# Patient Record
Sex: FEMALE | Race: WHITE | URBAN - METROPOLITAN AREA
[De-identification: names, ages, dates, MRNs, and addresses within clinical notes are randomized per-mention and may not be internally consistent; named-entity substitution may affect disease eponyms.]

---

## 2017-01-09 ENCOUNTER — TRANSFERRED RECORDS (OUTPATIENT)
Dept: HEALTH INFORMATION MANAGEMENT | Facility: CLINIC | Age: 47
End: 2017-01-09

## 2017-01-19 ENCOUNTER — MEDICAL CORRESPONDENCE (OUTPATIENT)
Dept: HEALTH INFORMATION MANAGEMENT | Facility: CLINIC | Age: 47
End: 2017-01-19

## 2017-01-19 ENCOUNTER — TRANSFERRED RECORDS (OUTPATIENT)
Dept: HEALTH INFORMATION MANAGEMENT | Facility: CLINIC | Age: 47
End: 2017-01-19

## 2017-01-23 ENCOUNTER — TRANSFERRED RECORDS (OUTPATIENT)
Dept: HEALTH INFORMATION MANAGEMENT | Facility: CLINIC | Age: 47
End: 2017-01-23

## 2017-01-26 ENCOUNTER — OFFICE VISIT (OUTPATIENT)
Dept: SURGERY | Facility: CLINIC | Age: 47
End: 2017-01-26
Attending: STUDENT IN AN ORGANIZED HEALTH CARE EDUCATION/TRAINING PROGRAM
Payer: MEDICARE

## 2017-01-26 VITALS
WEIGHT: 210.4 LBS | DIASTOLIC BLOOD PRESSURE: 77 MMHG | TEMPERATURE: 98.3 F | OXYGEN SATURATION: 98 % | SYSTOLIC BLOOD PRESSURE: 121 MMHG | BODY MASS INDEX: 31.16 KG/M2 | RESPIRATION RATE: 20 BRPM | HEART RATE: 98 BPM | HEIGHT: 69 IN

## 2017-01-26 DIAGNOSIS — Z01.818 PREOPERATIVE TESTING: ICD-10-CM

## 2017-01-26 DIAGNOSIS — K44.9 PARAESOPHAGEAL HERNIA: Primary | ICD-10-CM

## 2017-01-26 PROCEDURE — 99212 OFFICE O/P EST SF 10 MIN: CPT | Mod: ZF | Performed by: STUDENT IN AN ORGANIZED HEALTH CARE EDUCATION/TRAINING PROGRAM

## 2017-01-26 NOTE — Clinical Note
"1/26/2017      RE: Loli Pearson  [address] [place] [address]       THORACIC SURGERY - NEW PATIENT OFFICE VISIT    Dear Dr. Parmar,    I saw Ms. Pearson in consultation for the evaluation and treatment of hiatal hernia.    HPI  Ms. Loli Pearson is a 46 year old female with history of peripheral neuropathy and fibromyalgia who presents with a history of dysphagia for several years. She reports \"swallowing issues\" for years which she describes as feeling like food is stuck in her throat. She underwent EGD with esophageal dilatation November 2015 for this symptom. She is unsure of whether she was told she had esophageal stricture or webs at the time of EGD. She notes slight improvement in dysphagia after procedure which slowly returned over time. She also started to experience acid reflux after dilation procedure that has become quite severe.  She reports that symptoms are daily and severely limit her ability to sleep. She had one episode of emesis last month after drinking soda that came on suddenly.       Previsit Tests  [facility] upper GI: moderate sized hiatal hernia, spontaneous mild reflux  CT 1/23/17: moderately large sliding type hiatal hernia, GE junction above the level of diaphragm, no paraesophageal hernia, small cyst in liver      PMH  Restless leg syndrome  Fibromyalgia  Peripheral neuropathy  Depression/Anxiety     PSH  Right rotator cuff surgery 2014  Back lipoma removal  Hysterectomy age 35  Right ear surgery age 23    SOC  Rare etoh use  Denies tobacco or illicit drug use  Lives with   Has three children  Unemployed    Physical examination  BMI 31       IMPRESSION    Ms. Pearson is a 46 year old female with history of sliding type hiatal hernia complicated by dysphagia and acid reflux who presents for surgical consultation.    PLAN  I spent a total of 30 minutes with Ms. Pearson and her , more than 50% of which were spent in counseling, coordination of care, and face-to-face time. " I reviewed the plan as follows:  1.) Procedure planned: Laparoscopic hiatal hernia repair, possible nissen fundoplication, possible gastropexy, possible gastrostomy tube placement pending work up  Necessary Preop Tests & Appointments:  -PAC assessment  -esophageal manometry  -pH testing    All expressed questions were answered and all parties present were in agreement with the plan.  I appreciate the opportunity to participate in the care of your patient and will keep you updated.  Sincerely,    Cheryl Schilling MD

## 2017-01-26 NOTE — Clinical Note
"1/26/2017       RE: Loli Pearson  [address] [place] [address]     Dear Colleague,    Thank you for referring your patient, Loli Pearson, to the [facility]. Please see a copy of my visit note below.    THORACIC SURGERY - NEW PATIENT OFFICE VISIT    Dear Dr. Parmar,    I saw Ms. Pearson in consultation for the evaluation and treatment of hiatal hernia.    HPI  Ms. Loli Pearson is a 46 year old female with history of peripheral neuropathy and fibromyalgia who presents with a history of dysphagia for several years. She reports \"swallowing issues\" for years which she describes as feeling like food is stuck in her throat. She underwent EGD with esophageal dilatation November 2015 for this symptom. She is unsure of whether she was told she had esophageal stricture or webs at the time of EGD. She notes slight improvement in dysphagia after procedure which slowly returned over time. She also started to experience acid reflux after dilation procedure that has become quite severe.  She reports that symptoms are daily and severely limit her ability to sleep. She had one episode of emesis last month after drinking soda that came on suddenly.       Previsit Tests  [facility] upper GI: moderate sized hiatal hernia, spontaneous mild reflux  CT 1/23/17: moderately large sliding type hiatal hernia, GE junction above the level of diaphragm, no paraesophageal hernia, small cyst in liver      PMH  Restless leg syndrome  Fibromyalgia  Peripheral neuropathy  Depression/Anxiety     PSH  Right rotator cuff surgery 2014  Back lipoma removal  Hysterectomy age 35  Right ear surgery age 23    SOC  Rare etoh use  Denies tobacco or illicit drug use  Lives with   Has three children  Unemployed    Physical examination  BMI 31       IMPRESSION    Ms. Pearson is a 46 year old female with history of sliding type hiatal hernia complicated by dysphagia and acid reflux who presents for surgical consultation.    PLAN  I " spent a total of 30 minutes with Ms. Pearson and her , more than 50% of which were spent in counseling, coordination of care, and face-to-face time. I reviewed the plan as follows:  1.) Procedure planned: Laparoscopic hiatal hernia repair, possible nissen fundoplication, possible gastropexy, possible gastrostomy tube placement pending work up  Necessary Preop Tests & Appointments:  -PAC assessment  -esophageal manometry  -pH testing    All expressed questions were answered and all parties present were in agreement with the plan.  I appreciate the opportunity to participate in the care of your patient and will keep you updated.  Sincerely,      Cheryl Schilling MD

## 2017-01-26 NOTE — PROGRESS NOTES
"THORACIC SURGERY - NEW PATIENT OFFICE VISIT    Dear Dr. Parmar,    I saw Ms. Pearson in consultation for the evaluation and treatment of hiatal hernia.    HPI  Ms. Loli Pearson is a 46 year old female with history of peripheral neuropathy and fibromyalgia who presents with a history of dysphagia for several years. She reports \"swallowing issues\" for years which she describes as feeling like food is stuck in her throat. She underwent EGD with esophageal dilatation November 2015 for this symptom. She is unsure of whether she was told she had esophageal stricture or webs at the time of EGD. She notes slight improvement in dysphagia after procedure which slowly returned over time. She also started to experience acid reflux after dilation procedure that has become quite severe.  She reports that symptoms are daily and severely limit her ability to sleep. She had one episode of emesis last month after drinking soda that came on suddenly.       Previsit Tests  [facility] upper GI: moderate sized hiatal hernia, spontaneous mild reflux  CT 1/23/17: moderately large sliding type hiatal hernia, GE junction above the level of diaphragm, no paraesophageal hernia, small cyst in liver      PMH  Restless leg syndrome  Fibromyalgia  Peripheral neuropathy  Depression/Anxiety     PSH  Right rotator cuff surgery 2014  Back lipoma removal  Hysterectomy age 35  Right ear surgery age 23    SOC  Rare etoh use  Denies tobacco or illicit drug use  Lives with   Has three children  Unemployed    Physical examination  BMI 31       IMPRESSION    Ms. Pearson is a 46 year old female with history of sliding type hiatal hernia complicated by dysphagia and acid reflux who presents for surgical consultation.    PLAN  I spent a total of 30 minutes with Ms. Pearson and her , more than 50% of which were spent in counseling, coordination of care, and face-to-face time. I reviewed the plan as follows:  1.) Procedure planned: Laparoscopic hiatal " hernia repair, possible nissen fundoplication, possible gastropexy, possible gastrostomy tube placement pending work up  Necessary Preop Tests & Appointments:  -PAC assessment  -esophageal manometry  -pH testing    All expressed questions were answered and all parties present were in agreement with the plan.  I appreciate the opportunity to participate in the care of your patient and will keep you updated.  Sincerely,

## 2017-01-26 NOTE — NURSING NOTE
"Loli Pearson is a 46 year old female who presents for:  Chief Complaint   Patient presents with     Oncology Clinic Visit     Pt is here for hiatal hernia        Initial Vitals:  /77 mmHg  Pulse 98  Temp(Src) 98.3  F (36.8  C) (Oral)  Resp 20  Ht 1.753 m (5' 9\")  Wt 95.437 kg (210 lb 6.4 oz)  BMI 31.06 kg/m2  SpO2 98%  Breastfeeding? No Estimated body mass index is 31.06 kg/(m^2) as calculated from the following:    Height as of this encounter: 1.753 m (5' 9\").    Weight as of this encounter: 95.437 kg (210 lb 6.4 oz).. Body surface area is 2.16 meters squared. BP completed using cuff size: large  Data Unavailable No LMP recorded. Patient has had a hysterectomy. Allergies and medications reviewed.     Medications: Medication refills not needed today.  Pharmacy name entered into EPIC: Data Unavailable    Comments:     6 minutes for nursing intake (face to face time)   Shira Leal CMA          "

## 2017-01-31 ENCOUNTER — TELEPHONE (OUTPATIENT)
Dept: SURGERY | Facility: CLINIC | Age: 47
End: 2017-01-31

## 2017-01-31 DIAGNOSIS — K44.9 PARAESOPHAGEAL HERNIA: Primary | ICD-10-CM

## 2017-02-15 ENCOUNTER — HOSPITAL ENCOUNTER (OUTPATIENT)
Facility: CLINIC | Age: 47
Discharge: HOME | End: 2017-02-15
Attending: INTERNAL MEDICINE | Admitting: INTERNAL MEDICINE
Payer: MEDICARE

## 2017-02-15 ENCOUNTER — SURGERY (OUTPATIENT)
Age: 47
End: 2017-02-15

## 2017-02-15 PROCEDURE — 91038 ESOPH IMPED FUNCT TEST > 1HR: CPT | Performed by: INTERNAL MEDICINE

## 2017-02-15 NOTE — OR NURSING
Pt here for manometry/24 hr ph. Procedure explained.  Catheter then placed easily to 55 cm. Catheter calibrated and pulled back to 48 cm for swallows. Swallows given per protocol and pt tolerated  well. Catheter removed. Ph probe then placed  Easily to 60 cm and pt tolerated well. Probe pulled to 33    cm for study.DC and diary instructions given. Pt dc to home in NAD.

## 2017-02-15 NOTE — DISCHARGE INSTRUCTIONS
1.  May resume regular diet.    2.  May have bloody nose, stuffy nose or sore throat after procedure.    3.  If 24 pH probe placed, return the next day to have probe removed.  Removal will only        take 5 minutes.    4.  Any questions call [phone] from 7AM to 4:30PM.  After hours call [phone]      and ask for GI fellow on call.

## 2017-02-15 NOTE — IP AVS SNAPSHOT
MRN:[id number]                      After Visit Summary   2/15/2017    Loli Pearson    MRN: [id number]           Thank you!     Thank you for choosing [facility] for your care. Our goal is always to provide you with excellent care. Hearing back from our patients is one way we can continue to improve our services. Please take a few minutes to complete the written survey that you may receive in the mail after you visit with us. Thank you!        Patient Information     Date Of Birth          1970        About your hospital stay     You were admitted on:  February 15, 2017 You last received care in the:  [facility], Endoscopy    You were discharged on:  February 15, 2017       Who to Call     For medical emergencies, please call 911.  For non-urgent questions about your medical care, please call your primary care provider or clinic, [phone]  For questions related to your surgery, please call your surgery clinic        Attending Provider     Provider Sumanth Garza MD Gastroenterology       Primary Care Provider Office Phone # Fax #    Noemy RizoALEKSANDAR [phone]       [facility] [address] [address]        Your next 10 appointments already scheduled     Mar 13, 2017   Procedure with Cheryl Schilling MD   [facility], Same Day Surgery (--)    [address] [facility] [address] [phone]              Further instructions from your care team       1.  May resume regular diet.    2.  May have bloody nose, stuffy nose or sore throat after procedure.    3.  If 24 pH probe placed, return the next day to have probe removed.  Removal will only        take 5 minutes.    4.  Any questions call [phone] from 7AM to 4:30PM.  After hours call [phone]      and ask for GI fellow on call.      Pending Results     No orders found from 2/13/2017 to 2/16/2017.            Admission Information     Date & Time Provider Department  Dept. Phone    2/15/2017 Sumanth Doyle MD [facility], [place], Endoscopy [phone]      MyChart Information     [employer] gives you secure access to your electronic health record. If you see a primary care provider, you can also send messages to your care team and make appointments. If you have questions, please call your primary care clinic.  If you do not have a primary care provider, please call [phone] and they will assist you.        Care EveryWhere ID     This is your Care EveryWhere ID. This could be used by other organizations to access your [facility] medical records  [id number]           Review of your medicines      UNREVIEWED medicines. Ask your doctor about these medicines        Dose / Directions    BUPROPION HCL PO        Dose:  150 mg   Take 150 mg by mouth daily   Refills:  0       DULOXETINE HCL PO        Dose:  60 mg   Take 60 mg by mouth 2 times daily   Refills:  0       HYDROCHLOROTHIAZIDE PO   Indication:  take half tablet daily        Dose:  25 mg   Take 25 mg by mouth daily   Refills:  0       NORTRIPTYLINE HCL PO        Dose:  25 mg   Take 25 mg by mouth At Bedtime   Refills:  0       * PRAMIPEXOLE DIHYDROCHLORIDE PO        Dose:  0.25 mg   Take 0.25 mg by mouth daily   Refills:  0       * PRAMIPEXOLE DIHYDROCHLORIDE PO        Refills:  0       PROPRANOLOL HCL PO        Dose:  60 mg   Take 60 mg by mouth daily   Refills:  0       TRAZODONE HCL PO        Dose:  50 mg   Take 50 mg by mouth At Bedtime   Refills:  0       * Notice:  This list has 2 medication(s) that are the same as other medications prescribed for you. Read the directions carefully, and ask your doctor or other care provider to review them with you.             Protect others around you: Learn how to safely use, store and throw away your medicines at www.disposemymeds.org.             Medication List: This is a list of all your medications and when to take them. Check marks below indicate your daily home schedule. Keep  this list as a reference.      Medications           Morning Afternoon Evening Bedtime As Needed    BUPROPION HCL PO   Take 150 mg by mouth daily                                DULOXETINE HCL PO   Take 60 mg by mouth 2 times daily                                HYDROCHLOROTHIAZIDE PO   Take 25 mg by mouth daily                                NORTRIPTYLINE HCL PO   Take 25 mg by mouth At Bedtime                                * PRAMIPEXOLE DIHYDROCHLORIDE PO   Take 0.25 mg by mouth daily                                * PRAMIPEXOLE DIHYDROCHLORIDE PO                                PROPRANOLOL HCL PO   Take 60 mg by mouth daily                                TRAZODONE HCL PO   Take 50 mg by mouth At Bedtime                                * Notice:  This list has 2 medication(s) that are the same as other medications prescribed for you. Read the directions carefully, and ask your doctor or other care provider to review them with you.

## 2017-03-10 ENCOUNTER — ANESTHESIA EVENT (OUTPATIENT)
Dept: SURGERY | Facility: CLINIC | Age: 47
DRG: 327 | End: 2017-03-10
Payer: MEDICARE

## 2017-03-11 ASSESSMENT — LIFESTYLE VARIABLES: TOBACCO_USE: 0

## 2017-03-11 NOTE — ANESTHESIA PREPROCEDURE EVALUATION
Anesthesia Evaluation     . Pt has had prior anesthetic. Type: General and MAC    No history of anesthetic complications     ROS/MED HX    ENT/Pulmonary: Comment: CT C/A/P (1/2017): No pulmonary issues noted on CT - neg pulmonary ROS    (-) tobacco use   Neurologic:     (+)neuropathy (FIbromyalgia, peripheral neuropathy with Complex Regional Pain Syndrome of RUE) - post operative CRPS after shoulder surgery, other neuro (Restless Leg Syndrome on Pramipexole)     Cardiovascular:        (-) taking anticoagulants/antiplatelets   METS/Exercise Tolerance:  >4 METS   Hematologic:  - neg hematologic  ROS       Musculoskeletal: Comment: LE Swelling  (+) arthritis (Right Hand), , , other musculoskeletal-       GI/Hepatic:     (+) GERD (Spontaneous Mild Reflux - Dysphagia) Symptomatic, hiatal hernia (Large Sliding Hiatal Hernia above diaphgram), liver disease (Liver cyst),       Renal/Genitourinary:  - ROS Renal section negative       Endo:     (+) Obesity, .      Psychiatric:     (+) psychiatric history depression and anxiety      Infectious Disease:  - neg infectious disease ROS       Malignancy:      - no malignancy   Other:    (+) No chance of pregnancy C-spine cleared: N/A, H/O Chronic Pain,no other significant disability              Physical Exam      Airway   Mallampati: II  TM distance: >3 FB  Neck ROM: full    Dental   (+) chipped    Cardiovascular   Rhythm and rate: regular and normal      Pulmonary    breath sounds clear to auscultation                    Anesthesia Plan      History & Physical Review  History and physical reviewed and following examination; no interval change.    ASA Status:  2 .    NPO Status:  > 8 hours    Plan for General, RSI and ETT with Intravenous and Propofol induction. Maintenance will be Balanced.    PONV prophylaxis:  Ondansetron (or other 5HT-3) and Dexamethasone or Solumedrol  Additional equipment: 2nd IV      Postoperative Care  Postoperative pain management:  IV analgesics, Oral  pain medications and Multi-modal analgesia.      Consents  Anesthetic plan, risks, benefits and alternatives discussed with:  Patient.  Use of blood products discussed: Yes.   Use of blood products discussed with Patient.  Consented to blood products.  .        Procedure: Procedure(s):  Laparoscopic Paraesophageal Herina Repair, Upper Endoscopy, Possible Laparotomy, Possible Laparoscopic Nissen, Possible Gastropexy, Possible Laparoscopic Gastrostomy Tube  Anesthesia general with block  - Wound Class:    - Wound Class:    - Wound Class:    - Wound Class:    - Wound Class:    - Wound Class:     HPI: Loli Pearson is a 46 year old female with history as below who is scheduled for the above procedure.     PMHx/PSHx:  Past Medical History   Diagnosis Date     Gastroesophageal reflux disease      Other chronic pain      Paraesophageal hernia      Shortness of breath      due to hernia     Past Surgical History   Procedure Laterality Date     Hc esoph/gas reflux test w nasal imped >1 hr N/A 2/15/2017     Procedure: ESOPHAGEAL IMPEDENCE FUNCTION TEST WITH 24 HOUR PH GREATER THAN 1 HOUR;  Surgeon: Sumanth Doyle MD;  Location: [facility]     Gyn surgery       partial hysterectomy     Ent surgery       right ear surgery     Biopsy       fatty lipoma on back removed     Orthopedic surgery       right rotator cuff surgery     Colonoscopy       Social History   Substance Use Topics     Smoking status: Never Smoker     Smokeless tobacco: Not on file     Alcohol use No     No Known Allergies  No prescriptions prior to admission.     Current Outpatient Prescriptions   Medication Sig Dispense Refill     HYDROCHLOROTHIAZIDE PO Take 25 mg by mouth daily       PRAMIPEXOLE DIHYDROCHLORIDE PO Take 0.25 mg by mouth daily       NORTRIPTYLINE HCL PO Take 25 mg by mouth At Bedtime       Objective:   Labs  BMP (12/2016): Na (140), K (3.7), BUN (11), Cre (0.8), Glu (110)  CBC (2/2016): Hgb (11.9), Plt (215)    Assessment: Loli Shah  Samples is a 46 year old female with history of symptomatic (GERD, reflux, dysphagia) hiatal hernia who is scheduled for Procedure(s):  Laparoscopic Paraesophageal Herina Repair, Upper Endoscopy, Possible Laparotomy, Possible Laparoscopic Nissen, Possible Gastropexy, Possible Laparoscopic Gastrostomy Tube  Anesthesia general with block  - Wound Class:    - Wound Class:    - Wound Class:    - Wound Class:    - Wound Class:    - Wound Class:  with Dr. Schilling. Patient has notable history of large sliding hiatal hernia (above diaphragm), Fibromyalgia, peripheral neuropathy, CRPS of RUE, arthritis of RUE, Restless leg syndrome, MDD/YAO. No specific cardiac/pulmonary pathology of note.    Plan: To be discussed with staff.   - Possible block post op, pre op tylenol/gabapentin  - GETA/RSI (given spontaneous reflux) with standard ASA monitors, IV induction, balanced anesthetic  - Monitoring and Access: PIV  - Labs: Hgb, BMP, T&S on DOS  - Previous Anesthesia: No reported complications with General anesthesia in past  - Antibiotics per surgery  - PONV prophylaxis    Abundio Bernard, CA-1  [phone]

## 2017-03-13 ENCOUNTER — APPOINTMENT (OUTPATIENT)
Dept: GENERAL RADIOLOGY | Facility: CLINIC | Age: 47
DRG: 327 | End: 2017-03-13
Attending: STUDENT IN AN ORGANIZED HEALTH CARE EDUCATION/TRAINING PROGRAM
Payer: MEDICARE

## 2017-03-13 ENCOUNTER — HOSPITAL ENCOUNTER (INPATIENT)
Facility: CLINIC | Age: 47
LOS: 3 days | Discharge: HOME | DRG: 327 | End: 2017-03-16
Attending: STUDENT IN AN ORGANIZED HEALTH CARE EDUCATION/TRAINING PROGRAM | Admitting: STUDENT IN AN ORGANIZED HEALTH CARE EDUCATION/TRAINING PROGRAM
Payer: MEDICARE

## 2017-03-13 ENCOUNTER — ANESTHESIA (OUTPATIENT)
Dept: SURGERY | Facility: CLINIC | Age: 47
DRG: 327 | End: 2017-03-13
Payer: MEDICARE

## 2017-03-13 DIAGNOSIS — K44.9 HIATAL HERNIA: ICD-10-CM

## 2017-03-13 DIAGNOSIS — Z01.818 PREOPERATIVE TESTING: ICD-10-CM

## 2017-03-13 DIAGNOSIS — G89.18 ACUTE POST-OPERATIVE PAIN: ICD-10-CM

## 2017-03-13 DIAGNOSIS — R19.4 BOWEL HABIT CHANGES: Primary | ICD-10-CM

## 2017-03-13 LAB
ABO + RH BLD: NORMAL
ABO + RH BLD: NORMAL
BLD GP AB SCN SERPL QL: NORMAL
BLOOD BANK CMNT PATIENT-IMP: NORMAL
CREAT SERPL-MCNC: 0.7 MG/DL (ref 0.52–1.04)
GFR SERPL CREATININE-BSD FRML MDRD: 88 ML/MIN/1.7M2
HGB BLD-MCNC: 10.9 G/DL (ref 11.7–15.7)
PLATELET # BLD AUTO: 198 10E9/L (ref 150–450)
POTASSIUM SERPL-SCNC: 3.7 MMOL/L (ref 3.4–5.3)
SPECIMEN EXP DATE BLD: NORMAL

## 2017-03-13 PROCEDURE — 36000062 ZZH SURGERY LEVEL 4 1ST 30 MIN - UMMC: Performed by: STUDENT IN AN ORGANIZED HEALTH CARE EDUCATION/TRAINING PROGRAM

## 2017-03-13 PROCEDURE — 25000132 ZZH RX MED GY IP 250 OP 250 PS 637: Mod: GY | Performed by: THORACIC SURGERY (CARDIOTHORACIC VASCULAR SURGERY)

## 2017-03-13 PROCEDURE — 94640 AIRWAY INHALATION TREATMENT: CPT

## 2017-03-13 PROCEDURE — 36415 COLL VENOUS BLD VENIPUNCTURE: CPT | Performed by: THORACIC SURGERY (CARDIOTHORACIC VASCULAR SURGERY)

## 2017-03-13 PROCEDURE — 25000125 ZZHC RX 250: Performed by: ANESTHESIOLOGY

## 2017-03-13 PROCEDURE — 0DV44ZZ RESTRICTION OF ESOPHAGOGASTRIC JUNCTION, PERCUTANEOUS ENDOSCOPIC APPROACH: ICD-10-PCS | Performed by: STUDENT IN AN ORGANIZED HEALTH CARE EDUCATION/TRAINING PROGRAM

## 2017-03-13 PROCEDURE — 25000132 ZZH RX MED GY IP 250 OP 250 PS 637: Mod: GY | Performed by: SURGERY

## 2017-03-13 PROCEDURE — C9290 INJ, BUPIVACAINE LIPOSOME: HCPCS

## 2017-03-13 PROCEDURE — 0DH60UZ INSERTION OF FEEDING DEVICE INTO STOMACH, OPEN APPROACH: ICD-10-PCS | Performed by: STUDENT IN AN ORGANIZED HEALTH CARE EDUCATION/TRAINING PROGRAM

## 2017-03-13 PROCEDURE — 85018 HEMOGLOBIN: CPT | Performed by: STUDENT IN AN ORGANIZED HEALTH CARE EDUCATION/TRAINING PROGRAM

## 2017-03-13 PROCEDURE — 25800025 ZZH RX 258: Performed by: STUDENT IN AN ORGANIZED HEALTH CARE EDUCATION/TRAINING PROGRAM

## 2017-03-13 PROCEDURE — 25000128 H RX IP 250 OP 636: Performed by: CLINICAL NURSE SPECIALIST

## 2017-03-13 PROCEDURE — 25000125 ZZHC RX 250: Performed by: STUDENT IN AN ORGANIZED HEALTH CARE EDUCATION/TRAINING PROGRAM

## 2017-03-13 PROCEDURE — 25000128 H RX IP 250 OP 636: Performed by: ANESTHESIOLOGY

## 2017-03-13 PROCEDURE — 25000566 ZZH SEVOFLURANE, EA 15 MIN: Performed by: STUDENT IN AN ORGANIZED HEALTH CARE EDUCATION/TRAINING PROGRAM

## 2017-03-13 PROCEDURE — A9270 NON-COVERED ITEM OR SERVICE: HCPCS | Mod: GY | Performed by: THORACIC SURGERY (CARDIOTHORACIC VASCULAR SURGERY)

## 2017-03-13 PROCEDURE — 36415 COLL VENOUS BLD VENIPUNCTURE: CPT | Performed by: STUDENT IN AN ORGANIZED HEALTH CARE EDUCATION/TRAINING PROGRAM

## 2017-03-13 PROCEDURE — 40000275 ZZH STATISTIC RCP TIME EA 10 MIN

## 2017-03-13 PROCEDURE — 12000001 ZZH R&B MED SURG/OB UMMC

## 2017-03-13 PROCEDURE — 40000940 XR CHEST PORT 1 VW

## 2017-03-13 PROCEDURE — 86850 RBC ANTIBODY SCREEN: CPT | Performed by: CLINICAL NURSE SPECIALIST

## 2017-03-13 PROCEDURE — 25000128 H RX IP 250 OP 636

## 2017-03-13 PROCEDURE — 0DX64Z5 TRANSFER STOMACH TO ESOPHAGUS, PERCUTANEOUS ENDOSCOPIC APPROACH: ICD-10-PCS | Performed by: STUDENT IN AN ORGANIZED HEALTH CARE EDUCATION/TRAINING PROGRAM

## 2017-03-13 PROCEDURE — 36000064 ZZH SURGERY LEVEL 4 EA 15 ADDTL MIN - UMMC: Performed by: STUDENT IN AN ORGANIZED HEALTH CARE EDUCATION/TRAINING PROGRAM

## 2017-03-13 PROCEDURE — 0BQS4ZZ REPAIR LEFT DIAPHRAGM, PERCUTANEOUS ENDOSCOPIC APPROACH: ICD-10-PCS | Performed by: STUDENT IN AN ORGANIZED HEALTH CARE EDUCATION/TRAINING PROGRAM

## 2017-03-13 PROCEDURE — 86901 BLOOD TYPING SEROLOGIC RH(D): CPT | Performed by: CLINICAL NURSE SPECIALIST

## 2017-03-13 PROCEDURE — 0BQR4ZZ REPAIR RIGHT DIAPHRAGM, PERCUTANEOUS ENDOSCOPIC APPROACH: ICD-10-PCS | Performed by: STUDENT IN AN ORGANIZED HEALTH CARE EDUCATION/TRAINING PROGRAM

## 2017-03-13 PROCEDURE — 27210794 ZZH OR GENERAL SUPPLY STERILE: Performed by: STUDENT IN AN ORGANIZED HEALTH CARE EDUCATION/TRAINING PROGRAM

## 2017-03-13 PROCEDURE — 25000128 H RX IP 250 OP 636: Performed by: THORACIC SURGERY (CARDIOTHORACIC VASCULAR SURGERY)

## 2017-03-13 PROCEDURE — 25000128 H RX IP 250 OP 636: Performed by: STUDENT IN AN ORGANIZED HEALTH CARE EDUCATION/TRAINING PROGRAM

## 2017-03-13 PROCEDURE — 82565 ASSAY OF CREATININE: CPT | Performed by: THORACIC SURGERY (CARDIOTHORACIC VASCULAR SURGERY)

## 2017-03-13 PROCEDURE — 85049 AUTOMATED PLATELET COUNT: CPT | Performed by: THORACIC SURGERY (CARDIOTHORACIC VASCULAR SURGERY)

## 2017-03-13 PROCEDURE — 84132 ASSAY OF SERUM POTASSIUM: CPT | Performed by: STUDENT IN AN ORGANIZED HEALTH CARE EDUCATION/TRAINING PROGRAM

## 2017-03-13 PROCEDURE — 25000125 ZZHC RX 250: Performed by: THORACIC SURGERY (CARDIOTHORACIC VASCULAR SURGERY)

## 2017-03-13 PROCEDURE — 25000128 H RX IP 250 OP 636: Performed by: SURGERY

## 2017-03-13 PROCEDURE — 40000170 ZZH STATISTIC PRE-PROCEDURE ASSESSMENT II: Performed by: STUDENT IN AN ORGANIZED HEALTH CARE EDUCATION/TRAINING PROGRAM

## 2017-03-13 PROCEDURE — A9270 NON-COVERED ITEM OR SERVICE: HCPCS | Mod: GY | Performed by: SURGERY

## 2017-03-13 PROCEDURE — 86900 BLOOD TYPING SEROLOGIC ABO: CPT | Performed by: CLINICAL NURSE SPECIALIST

## 2017-03-13 PROCEDURE — 37000009 ZZH ANESTHESIA TECHNICAL FEE, EACH ADDTL 15 MIN: Performed by: STUDENT IN AN ORGANIZED HEALTH CARE EDUCATION/TRAINING PROGRAM

## 2017-03-13 PROCEDURE — 71000014 ZZH RECOVERY PHASE 1 LEVEL 2 FIRST HR: Performed by: STUDENT IN AN ORGANIZED HEALTH CARE EDUCATION/TRAINING PROGRAM

## 2017-03-13 PROCEDURE — 37000008 ZZH ANESTHESIA TECHNICAL FEE, 1ST 30 MIN: Performed by: STUDENT IN AN ORGANIZED HEALTH CARE EDUCATION/TRAINING PROGRAM

## 2017-03-13 RX ORDER — CEFAZOLIN SODIUM 2 G/100ML
2 INJECTION, SOLUTION INTRAVENOUS
Status: COMPLETED | OUTPATIENT
Start: 2017-03-13 | End: 2017-03-13

## 2017-03-13 RX ORDER — GABAPENTIN 300 MG/1
300 CAPSULE ORAL ONCE
Status: DISCONTINUED | OUTPATIENT
Start: 2017-03-13 | End: 2017-03-13 | Stop reason: HOSPADM

## 2017-03-13 RX ORDER — IPRATROPIUM BROMIDE AND ALBUTEROL SULFATE 2.5; .5 MG/3ML; MG/3ML
3 SOLUTION RESPIRATORY (INHALATION) 4 TIMES DAILY
Status: DISCONTINUED | OUTPATIENT
Start: 2017-03-13 | End: 2017-03-13

## 2017-03-13 RX ORDER — ALBUTEROL SULFATE 90 UG/1
4 AEROSOL, METERED RESPIRATORY (INHALATION)
Status: DISCONTINUED | OUTPATIENT
Start: 2017-03-13 | End: 2017-03-16 | Stop reason: HOSPADM

## 2017-03-13 RX ORDER — SODIUM CHLORIDE, SODIUM LACTATE, POTASSIUM CHLORIDE, CALCIUM CHLORIDE 600; 310; 30; 20 MG/100ML; MG/100ML; MG/100ML; MG/100ML
INJECTION, SOLUTION INTRAVENOUS CONTINUOUS
Status: DISCONTINUED | OUTPATIENT
Start: 2017-03-13 | End: 2017-03-15

## 2017-03-13 RX ORDER — BUPIVACAINE HYDROCHLORIDE AND EPINEPHRINE 2.5; 5 MG/ML; UG/ML
INJECTION, SOLUTION INFILTRATION; PERINEURAL PRN
Status: DISCONTINUED | OUTPATIENT
Start: 2017-03-13 | End: 2017-03-13

## 2017-03-13 RX ORDER — SODIUM CHLORIDE, SODIUM LACTATE, POTASSIUM CHLORIDE, CALCIUM CHLORIDE 600; 310; 30; 20 MG/100ML; MG/100ML; MG/100ML; MG/100ML
INJECTION, SOLUTION INTRAVENOUS CONTINUOUS
Status: DISCONTINUED | OUTPATIENT
Start: 2017-03-13 | End: 2017-03-13 | Stop reason: HOSPADM

## 2017-03-13 RX ORDER — PANTOPRAZOLE SODIUM 40 MG/1
40 TABLET, DELAYED RELEASE ORAL DAILY
Status: DISCONTINUED | OUTPATIENT
Start: 2017-03-13 | End: 2017-03-16 | Stop reason: HOSPADM

## 2017-03-13 RX ORDER — LABETALOL HYDROCHLORIDE 5 MG/ML
10-40 INJECTION, SOLUTION INTRAVENOUS EVERY 10 MIN PRN
Status: DISCONTINUED | OUTPATIENT
Start: 2017-03-13 | End: 2017-03-16 | Stop reason: HOSPADM

## 2017-03-13 RX ORDER — ALBUTEROL SULFATE 0.83 MG/ML
2.5 SOLUTION RESPIRATORY (INHALATION) 4 TIMES DAILY
Status: DISCONTINUED | OUTPATIENT
Start: 2017-03-13 | End: 2017-03-16 | Stop reason: HOSPADM

## 2017-03-13 RX ORDER — NALOXONE HYDROCHLORIDE 0.4 MG/ML
.1-.4 INJECTION, SOLUTION INTRAMUSCULAR; INTRAVENOUS; SUBCUTANEOUS
Status: DISCONTINUED | OUTPATIENT
Start: 2017-03-13 | End: 2017-03-13 | Stop reason: HOSPADM

## 2017-03-13 RX ORDER — CEFAZOLIN SODIUM 1 G/3ML
1 INJECTION, POWDER, FOR SOLUTION INTRAMUSCULAR; INTRAVENOUS SEE ADMIN INSTRUCTIONS
Status: DISCONTINUED | OUTPATIENT
Start: 2017-03-13 | End: 2017-03-13 | Stop reason: HOSPADM

## 2017-03-13 RX ORDER — BUPIVACAINE HYDROCHLORIDE AND EPINEPHRINE 2.5; 5 MG/ML; UG/ML
INJECTION, SOLUTION EPIDURAL; INFILTRATION; INTRACAUDAL; PERINEURAL PRN
Status: DISCONTINUED | OUTPATIENT
Start: 2017-03-13 | End: 2017-03-13 | Stop reason: HOSPADM

## 2017-03-13 RX ORDER — ACETAMINOPHEN 10 MG/ML
INJECTION, SOLUTION INTRAVENOUS PRN
Status: DISCONTINUED | OUTPATIENT
Start: 2017-03-13 | End: 2017-03-13

## 2017-03-13 RX ORDER — ACETAMINOPHEN 325 MG/1
650 TABLET ORAL EVERY 4 HOURS PRN
Status: DISCONTINUED | OUTPATIENT
Start: 2017-03-16 | End: 2017-03-15

## 2017-03-13 RX ORDER — FENTANYL CITRATE 50 UG/ML
25-50 INJECTION, SOLUTION INTRAMUSCULAR; INTRAVENOUS
Status: DISCONTINUED | OUTPATIENT
Start: 2017-03-13 | End: 2017-03-13 | Stop reason: HOSPADM

## 2017-03-13 RX ORDER — ONDANSETRON 4 MG/1
4 TABLET, ORALLY DISINTEGRATING ORAL EVERY 6 HOURS PRN
Status: DISCONTINUED | OUTPATIENT
Start: 2017-03-13 | End: 2017-03-16 | Stop reason: HOSPADM

## 2017-03-13 RX ORDER — DEXAMETHASONE SODIUM PHOSPHATE 4 MG/ML
INJECTION, SOLUTION INTRA-ARTICULAR; INTRALESIONAL; INTRAMUSCULAR; INTRAVENOUS; SOFT TISSUE PRN
Status: DISCONTINUED | OUTPATIENT
Start: 2017-03-13 | End: 2017-03-13

## 2017-03-13 RX ORDER — ONDANSETRON 2 MG/ML
4 INJECTION INTRAMUSCULAR; INTRAVENOUS EVERY 6 HOURS SCHEDULED
Status: DISCONTINUED | OUTPATIENT
Start: 2017-03-13 | End: 2017-03-16 | Stop reason: HOSPADM

## 2017-03-13 RX ORDER — HYDROMORPHONE HYDROCHLORIDE 1 MG/ML
.3-.5 INJECTION, SOLUTION INTRAMUSCULAR; INTRAVENOUS; SUBCUTANEOUS EVERY 5 MIN PRN
Status: DISCONTINUED | OUTPATIENT
Start: 2017-03-13 | End: 2017-03-13 | Stop reason: HOSPADM

## 2017-03-13 RX ORDER — ONDANSETRON 4 MG/1
4 TABLET, ORALLY DISINTEGRATING ORAL EVERY 30 MIN PRN
Status: DISCONTINUED | OUTPATIENT
Start: 2017-03-13 | End: 2017-03-13 | Stop reason: HOSPADM

## 2017-03-13 RX ORDER — ONDANSETRON 2 MG/ML
4 INJECTION INTRAMUSCULAR; INTRAVENOUS EVERY 30 MIN PRN
Status: DISCONTINUED | OUTPATIENT
Start: 2017-03-13 | End: 2017-03-13 | Stop reason: HOSPADM

## 2017-03-13 RX ORDER — LIDOCAINE HYDROCHLORIDE 20 MG/ML
INJECTION, SOLUTION INFILTRATION; PERINEURAL PRN
Status: DISCONTINUED | OUTPATIENT
Start: 2017-03-13 | End: 2017-03-13

## 2017-03-13 RX ORDER — ACETAMINOPHEN 325 MG/1
975 TABLET ORAL ONCE
Status: DISCONTINUED | OUTPATIENT
Start: 2017-03-13 | End: 2017-03-13 | Stop reason: HOSPADM

## 2017-03-13 RX ORDER — FENTANYL CITRATE 50 UG/ML
INJECTION, SOLUTION INTRAMUSCULAR; INTRAVENOUS PRN
Status: DISCONTINUED | OUTPATIENT
Start: 2017-03-13 | End: 2017-03-13

## 2017-03-13 RX ORDER — LABETALOL HYDROCHLORIDE 5 MG/ML
10 INJECTION, SOLUTION INTRAVENOUS
Status: DISCONTINUED | OUTPATIENT
Start: 2017-03-13 | End: 2017-03-13 | Stop reason: HOSPADM

## 2017-03-13 RX ORDER — ONDANSETRON 2 MG/ML
4 INJECTION INTRAMUSCULAR; INTRAVENOUS EVERY 6 HOURS PRN
Status: DISCONTINUED | OUTPATIENT
Start: 2017-03-13 | End: 2017-03-16 | Stop reason: HOSPADM

## 2017-03-13 RX ORDER — PRAMIPEXOLE DIHYDROCHLORIDE 0.12 MG/1
0.25 TABLET ORAL AT BEDTIME
Status: DISCONTINUED | OUTPATIENT
Start: 2017-03-13 | End: 2017-03-16 | Stop reason: HOSPADM

## 2017-03-13 RX ORDER — NALOXONE HYDROCHLORIDE 0.4 MG/ML
.1-.4 INJECTION, SOLUTION INTRAMUSCULAR; INTRAVENOUS; SUBCUTANEOUS
Status: DISCONTINUED | OUTPATIENT
Start: 2017-03-13 | End: 2017-03-16 | Stop reason: HOSPADM

## 2017-03-13 RX ORDER — NORTRIPTYLINE HCL 25 MG
25 CAPSULE ORAL AT BEDTIME
Status: DISCONTINUED | OUTPATIENT
Start: 2017-03-13 | End: 2017-03-16 | Stop reason: HOSPADM

## 2017-03-13 RX ORDER — PROPOFOL 10 MG/ML
INJECTION, EMULSION INTRAVENOUS PRN
Status: DISCONTINUED | OUTPATIENT
Start: 2017-03-13 | End: 2017-03-13

## 2017-03-13 RX ORDER — ONDANSETRON 2 MG/ML
INJECTION INTRAMUSCULAR; INTRAVENOUS PRN
Status: DISCONTINUED | OUTPATIENT
Start: 2017-03-13 | End: 2017-03-13

## 2017-03-13 RX ORDER — LIDOCAINE 40 MG/G
CREAM TOPICAL
Status: DISCONTINUED | OUTPATIENT
Start: 2017-03-13 | End: 2017-03-13 | Stop reason: HOSPADM

## 2017-03-13 RX ORDER — ACETAMINOPHEN 325 MG/1
975 TABLET ORAL EVERY 8 HOURS
Status: DISCONTINUED | OUTPATIENT
Start: 2017-03-13 | End: 2017-03-15

## 2017-03-13 RX ORDER — OXYCODONE HYDROCHLORIDE 5 MG/1
5-10 TABLET ORAL
Status: DISCONTINUED | OUTPATIENT
Start: 2017-03-13 | End: 2017-03-15

## 2017-03-13 RX ORDER — AMOXICILLIN 250 MG
1-2 CAPSULE ORAL 2 TIMES DAILY
Status: DISCONTINUED | OUTPATIENT
Start: 2017-03-13 | End: 2017-03-14

## 2017-03-13 RX ORDER — SODIUM CHLORIDE, SODIUM LACTATE, POTASSIUM CHLORIDE, CALCIUM CHLORIDE 600; 310; 30; 20 MG/100ML; MG/100ML; MG/100ML; MG/100ML
INJECTION, SOLUTION INTRAVENOUS CONTINUOUS PRN
Status: DISCONTINUED | OUTPATIENT
Start: 2017-03-13 | End: 2017-03-13

## 2017-03-13 RX ORDER — GLYCOPYRROLATE 0.2 MG/ML
INJECTION, SOLUTION INTRAMUSCULAR; INTRAVENOUS PRN
Status: DISCONTINUED | OUTPATIENT
Start: 2017-03-13 | End: 2017-03-13

## 2017-03-13 RX ORDER — NEOSTIGMINE METHYLSULFATE 1 MG/ML
VIAL (ML) INJECTION PRN
Status: DISCONTINUED | OUTPATIENT
Start: 2017-03-13 | End: 2017-03-13

## 2017-03-13 RX ORDER — FLUMAZENIL 0.1 MG/ML
0.2 INJECTION, SOLUTION INTRAVENOUS
Status: DISCONTINUED | OUTPATIENT
Start: 2017-03-13 | End: 2017-03-13 | Stop reason: HOSPADM

## 2017-03-13 RX ADMIN — FENTANYL CITRATE 50 MCG: 50 INJECTION, SOLUTION INTRAMUSCULAR; INTRAVENOUS at 12:10

## 2017-03-13 RX ADMIN — ROCURONIUM BROMIDE 20 MG: 10 INJECTION INTRAVENOUS at 09:28

## 2017-03-13 RX ADMIN — FENTANYL CITRATE 50 MCG: 50 INJECTION, SOLUTION INTRAMUSCULAR; INTRAVENOUS at 12:38

## 2017-03-13 RX ADMIN — PANTOPRAZOLE SODIUM 40 MG: 40 TABLET, DELAYED RELEASE ORAL at 15:46

## 2017-03-13 RX ADMIN — FENTANYL CITRATE 50 MCG: 50 INJECTION, SOLUTION INTRAMUSCULAR; INTRAVENOUS at 08:51

## 2017-03-13 RX ADMIN — PROCHLORPERAZINE EDISYLATE 5 MG: 5 INJECTION INTRAMUSCULAR; INTRAVENOUS at 22:34

## 2017-03-13 RX ADMIN — ACETAMINOPHEN 975 MG: 325 TABLET, FILM COATED ORAL at 15:46

## 2017-03-13 RX ADMIN — IPRATROPIUM BROMIDE AND ALBUTEROL SULFATE 3 ML: .5; 3 SOLUTION RESPIRATORY (INHALATION) at 16:51

## 2017-03-13 RX ADMIN — PHENYLEPHRINE HYDROCHLORIDE 50 MCG: 10 INJECTION, SOLUTION INTRAMUSCULAR; INTRAVENOUS; SUBCUTANEOUS at 09:23

## 2017-03-13 RX ADMIN — CEFAZOLIN 1 G: 1 INJECTION, POWDER, FOR SOLUTION INTRAMUSCULAR; INTRAVENOUS at 10:25

## 2017-03-13 RX ADMIN — PROPOFOL 20 MG: 10 INJECTION, EMULSION INTRAVENOUS at 12:39

## 2017-03-13 RX ADMIN — CEFAZOLIN SODIUM 2 G: 2 INJECTION, SOLUTION INTRAVENOUS at 08:25

## 2017-03-13 RX ADMIN — FENTANYL CITRATE 75 MCG: 50 INJECTION, SOLUTION INTRAMUSCULAR; INTRAVENOUS at 09:30

## 2017-03-13 RX ADMIN — PRAMIPEXOLE DIHYDROCHLORIDE 0.25 MG: 0.12 TABLET ORAL at 22:38

## 2017-03-13 RX ADMIN — MORPHINE SULFATE: 5 INJECTION, SOLUTION INTRAVENOUS at 13:24

## 2017-03-13 RX ADMIN — ROCURONIUM BROMIDE 5 MG: 10 INJECTION INTRAVENOUS at 11:13

## 2017-03-13 RX ADMIN — FENTANYL CITRATE 50 MCG: 50 INJECTION, SOLUTION INTRAMUSCULAR; INTRAVENOUS at 10:28

## 2017-03-13 RX ADMIN — LIDOCAINE HYDROCHLORIDE 100 MG: 20 INJECTION, SOLUTION INFILTRATION; PERINEURAL at 08:07

## 2017-03-13 RX ADMIN — FENTANYL CITRATE 50 MCG: 50 INJECTION, SOLUTION INTRAMUSCULAR; INTRAVENOUS at 13:23

## 2017-03-13 RX ADMIN — GLYCOPYRROLATE 0.8 MG: 0.2 INJECTION, SOLUTION INTRAMUSCULAR; INTRAVENOUS at 12:35

## 2017-03-13 RX ADMIN — BUPIVACAINE HYDROCHLORIDE AND EPINEPHRINE BITARTRATE 40 ML: 2.5; .005 INJECTION, SOLUTION INFILTRATION; PERINEURAL at 07:52

## 2017-03-13 RX ADMIN — MIDAZOLAM 2 MG: 1 INJECTION INTRAMUSCULAR; INTRAVENOUS at 07:57

## 2017-03-13 RX ADMIN — PHENYLEPHRINE HYDROCHLORIDE 50 MCG: 10 INJECTION, SOLUTION INTRAMUSCULAR; INTRAVENOUS; SUBCUTANEOUS at 09:21

## 2017-03-13 RX ADMIN — DEXAMETHASONE SODIUM PHOSPHATE 8 MG: 4 INJECTION, SOLUTION INTRAMUSCULAR; INTRAVENOUS at 08:15

## 2017-03-13 RX ADMIN — SUCCINYLCHOLINE CHLORIDE 100 MG: 20 INJECTION, SOLUTION INTRAMUSCULAR; INTRAVENOUS at 08:08

## 2017-03-13 RX ADMIN — SODIUM CHLORIDE, POTASSIUM CHLORIDE, SODIUM LACTATE AND CALCIUM CHLORIDE: 600; 310; 30; 20 INJECTION, SOLUTION INTRAVENOUS at 07:00

## 2017-03-13 RX ADMIN — FENTANYL CITRATE 75 MCG: 50 INJECTION, SOLUTION INTRAMUSCULAR; INTRAVENOUS at 08:05

## 2017-03-13 RX ADMIN — FENTANYL CITRATE 50 MCG: 50 INJECTION, SOLUTION INTRAMUSCULAR; INTRAVENOUS at 08:40

## 2017-03-13 RX ADMIN — PHENYLEPHRINE HYDROCHLORIDE 100 MCG: 10 INJECTION, SOLUTION INTRAMUSCULAR; INTRAVENOUS; SUBCUTANEOUS at 10:38

## 2017-03-13 RX ADMIN — HYDROMORPHONE HYDROCHLORIDE 0.5 MG: 10 INJECTION, SOLUTION INTRAMUSCULAR; INTRAVENOUS; SUBCUTANEOUS at 13:35

## 2017-03-13 RX ADMIN — ROCURONIUM BROMIDE 5 MG: 10 INJECTION INTRAVENOUS at 12:05

## 2017-03-13 RX ADMIN — ROCURONIUM BROMIDE 5 MG: 10 INJECTION INTRAVENOUS at 11:00

## 2017-03-13 RX ADMIN — SENNOSIDES AND DOCUSATE SODIUM 2 TABLET: 8.6; 5 TABLET ORAL at 21:25

## 2017-03-13 RX ADMIN — FENTANYL CITRATE 50 MCG: 50 INJECTION, SOLUTION INTRAMUSCULAR; INTRAVENOUS at 07:57

## 2017-03-13 RX ADMIN — BUPIVACAINE 20 ML: 13.3 INJECTION, SUSPENSION, LIPOSOMAL INFILTRATION at 07:52

## 2017-03-13 RX ADMIN — ACETAMINOPHEN 1000 MG: 10 INJECTION, SOLUTION INTRAVENOUS at 12:25

## 2017-03-13 RX ADMIN — ROCURONIUM BROMIDE 30 MG: 10 INJECTION INTRAVENOUS at 08:25

## 2017-03-13 RX ADMIN — ONDANSETRON 4 MG: 2 INJECTION INTRAMUSCULAR; INTRAVENOUS at 17:40

## 2017-03-13 RX ADMIN — NORTRIPTYLINE HYDROCHLORIDE 25 MG: 25 CAPSULE ORAL at 22:38

## 2017-03-13 RX ADMIN — PROPOFOL 160 MG: 10 INJECTION, EMULSION INTRAVENOUS at 08:07

## 2017-03-13 RX ADMIN — HYDROMORPHONE HYDROCHLORIDE 0.5 MG: 10 INJECTION, SOLUTION INTRAMUSCULAR; INTRAVENOUS; SUBCUTANEOUS at 13:13

## 2017-03-13 RX ADMIN — ROCURONIUM BROMIDE 10 MG: 10 INJECTION INTRAVENOUS at 10:19

## 2017-03-13 RX ADMIN — Medication 5 MG: at 12:35

## 2017-03-13 RX ADMIN — ONDANSETRON 6 MG: 2 INJECTION INTRAMUSCULAR; INTRAVENOUS at 12:32

## 2017-03-13 RX ADMIN — ROCURONIUM BROMIDE 5 MG: 10 INJECTION INTRAVENOUS at 11:51

## 2017-03-13 RX ADMIN — FENTANYL CITRATE 50 MCG: 50 INJECTION, SOLUTION INTRAMUSCULAR; INTRAVENOUS at 13:05

## 2017-03-13 NOTE — IP AVS SNAPSHOT
[address] [address]    Phone:  [phone]                                       After Visit Summary   3/13/2017    Loli Pearson    MRN: [id number]           After Visit Summary Signature Page     I have received my discharge instructions, and my questions have been answered. I have discussed any challenges I see with this plan with the nurse or doctor.    ..........................................................................................................................................  Patient/Patient Representative Signature      ..........................................................................................................................................  Patient Representative Print Name and Relationship to Patient    ..................................................               ................................................  Date                                            Time    ..........................................................................................................................................  Reviewed by Signature/Title    ...................................................              ..............................................  Date                                                            Time

## 2017-03-13 NOTE — ANESTHESIA PROCEDURE NOTES
Peripheral Nerve Block Procedure Note    Staff:     Anesthesiologist:  ISA SRIVASTAVA    Resident/CRNA:  EVITA GARCIA    Block performed by resident/CRNA in the presence of a teaching physician    Location: Pre-op  Procedure Start/Stop TImes:      3/13/2017 7:45 AM     3/13/2017 7:55 AM    patient identified, IV checked, site marked, risks and benefits discussed, informed consent, monitors and equipment checked, pre-op evaluation, at physician/surgeon's request and post-op pain management      Correct Patient: Yes      Correct Position: Yes      Correct Site: Yes      Correct Procedure: Yes      Correct Laterality:  Yes    Site Marked:  Yes  Procedure details:     Procedure:  TAP    ASA:  2    Diagnosis:  Paraesophageal hernia    Laterality:  Bilateral    Position:  Supine    Sterile Prep: chloraprep      Local skin infiltration:  1% lidocaine    amount (mL):  5    Insertion Site:  T5-6 and T10-11    Needle:  Insulated    Needle gauge:  21    Needle length (inches):  4    Ultrasound: Yes      Ultrasound used to identify targeted nerve, plexus, or vascular structure and placed a needle adjacent to it      Permanent Image entered into patiient's record      Abnormal pain on injection: No      Blood Aspirated: No      Paresthesias:  No    Bleeding at site: No      Test dose negative for signs of intravascular injection: Yes      Bolus via:  Needle    Infusion Method:  Single Shot    Blood aspirated via catheter: No      Complications:  None  Assessment/Narrative:     Injection made incrementally with aspirations every (mL):  5

## 2017-03-13 NOTE — BRIEF OP NOTE
[facility], [place]    Brief Operative Note    Pre-operative diagnosis: Paraesophageal Hernia   Post-operative diagnosis Paraesophageal Hernia   Procedure: Procedure(s):  Laparoscopic Hiatal Hernia Repair with Nissen Fundoplication, Upper Endoscopy, Percutaneous Insertion of Gastrostomy tube - Wound Class: I-Clean   - Wound Class: II-Clean Contaminated  Surgeon: Surgeon(s) and Role:     * Cheryl Schilling MD - Primary     * Matthew Sierra MD - Assisting     * Abdulkadir Griffin MD  Anesthesia: Combined General with Block   Estimated blood loss: 50mL  Drains: PEG tube  Specimens: none  Findings:   Hiatal hernia  Complications: none  Implants: none

## 2017-03-13 NOTE — ANESTHESIA POSTPROCEDURE EVALUATION
Patient: Loli Pearson    Procedure(s):  Laparoscopic Hiatal Hernia Repair with Nissen Fundoplication, Upper Endoscopy, Percutaneous Insertion of Gastrostomy tube - Wound Class: I-Clean   - Wound Class: II-Clean Contaminated    Diagnosis:Paraesophageal Hernia   Diagnosis Additional Information: No value filed.    Anesthesia Type:  General, RSI, ETT    Note:  Anesthesia Post Evaluation    Patient location during evaluation: PACU  Patient participation: Able to fully participate in evaluation  Level of consciousness: awake and alert  Pain management: adequate  Cardiovascular status: acceptable and hypertensive  Respiratory status: acceptable  Hydration status: acceptable  PONV: none       Comments: Patient initially with chest burning consistent with surgery (no abdominal pain with TAP block).  Pain described as sharp and burning.  Now much better controlled with fentanyl 100 and dilaudid 0.5 mg.  Rn is setting up PCA ordered by surgeons.  /90 at present but improving with pain control.        Last vitals:  Vitals:    03/13/17 1100 03/13/17 1253 03/13/17 1300   BP: 105/85 (!) 136/92 (!) 130/91   Resp:      Temp:  36.9  C (98.5  F)    SpO2:            Electronically Signed By: Eusebio Sutherland MD  March 13, 2017  1:28 PM

## 2017-03-13 NOTE — OP NOTE
DATE OF PROCEDURE:  03/13/2017.      SURGEON:  Cheryl Schilling MD      COSURGEON:  Abdulkadir Griffin MD      ASSISTING SURGEON:  Matthew iSerra MD      PREOPERATIVE DIAGNOSIS:  Gastroesophageal reflux disease with moderate to large hiatal hernia.      POSTOPERATIVE DIAGNOSIS:  Gastroesophageal reflux disease with moderate to large hiatal hernia.      ESTIMATED BLOOD LOSS:  50 mL.       OPERATIVE FINDINGS:  Moderate-sized hiatal hernia with short esophagus.      PROCEDURES PERFORMED:  Laparoscopic hiatal hernia repair with Uriah gastroplasty and Nissen fundoplication and laparoscopic-assisted percutaneous endoscopic gastrostomy tube placement.      DESCRIPTION OF PROCEDURE:  After obtaining informed consent, Loli Pearson was brought to the operating room and laid supine on the operating table.  After induction of general anesthesia and introduction of an endotracheal tube, the patient was positioned with the arms out and with the foot board to be able to put her in reverse Trendelenburg position.  The chest and abdomen were prepped and draped in a sterile manner.  Using the Kathie technique, the abdomen was entered in the supraumbilical area just to the left of the umbilicus.  We used a standard 5-port approach and placed fascial stitches for the first port for eventual closure.  The other ports were then placed with laparoscopic guidance and a Gabino retractor was inserted for the liver.  Moderate-sized hiatal hernia was identified.  We then took down the gastrohepatic ligament followed by the short gastrics and the hiatus was dissected circumferentially.  The hernia sac was reduced and mobilized to be able to mobilize the esophagus circumferentially all the way up to the inferior pulmonary vein.  Both vagus nerves were identified and preserved.  After adequate mobilization, we proceeded to close the hiatus with 7-0 silk stitches on pledgets with a 52 bougie in place.  After this was done, we identified the Z-line  endoscopically after mobilization of the esophagogastric fat pad.  There was not enough intraabdominal esophagus.  At this point, we proceeded with the Uriah gastroplasty using purple loads on the Endo SCOUT stapler.  The stomach at the GE junction was wedged out to tubularize additional length of the stomach and gain extra length on the intraabdominal esophagus.  After this had been done, we changed the bougie to a 48.  After this had been done, we changed the bougie again to 52 and proceeded to perform the Nissen wrap by placing the wrap between the esophagus and the anterior vagus using 3 stitches from stomach to the esophagus.  We then removed the bougie and identified with both endoscopy and using graspers that it was not too tight.  We then proceeded to identify a spot in the anterior gastric wall with the help with endoscopy and laparoscopy and passed a needle through the stomach, passed a wire through and pulled it out with the scope through the mouth.  We then looped the PEG tube through it and pulled the PEG tube out through an incision in the left upper quadrant.  We identified the PEG tube in the stomach with the endoscopy again and verified that there was no bleeding internally or externally.  Following this, hemostasis was ensured and the incisions were all closed in layers.  The fascial stitches were closed on the initial port site followed by subdermal and subcuticular layers.  The patient was then recovered and transferred to the recovery room in stable condition.         LITA PASTRANA MD             D: 2017 13:25   T: 2017 14:13   MT: DIONNE      Name:     GRABIEL CANTOR   MRN:      [id number]        Account:        [id number]   :      1970           Procedure Date: 2017      Document: [id number]

## 2017-03-13 NOTE — ANESTHESIA CARE TRANSFER NOTE
Patient: Loli Pearson    Procedure(s):  Laparoscopic Hiatal Hernia Repair with Nissen Fundoplication, Upper Endoscopy, Percutaneous Insertion of Gastrostomy tube - Wound Class: I-Clean   - Wound Class: II-Clean Contaminated    Diagnosis: Paraesophageal Hernia   Diagnosis Additional Information: No value filed.    Anesthesia Type:   General, RSI, ETT     Note:  Airway :Face Mask  Patient transferred to:PACU  Comments: Prior to extubation, patient breathing spontaneously and respiratory rate and tidal volume were appropriate. The patient was following commands. The patient was warm and demonstrated adequate strength. Patient was suctioned and extubated without complication.   Transported to PACU   VSS upon arrival   Care transferred to PACU RN      Vitals: (Last set prior to Anesthesia Care Transfer)    CRNA VITALS  3/13/2017 1218 - 3/13/2017 1252      3/13/2017             Pulse: 117    SpO2: 100 %    Resp Rate (observed): (!)  3    Resp Rate (set): 10                Electronically Signed By: Abundio Bernard MD  March 13, 2017  12:52 PM

## 2017-03-13 NOTE — IP AVS SNAPSHOT
MRN:[id number]                      After Visit Summary   3/13/2017    Loli Pearson    MRN: [id number]           Thank you!     Thank you for choosing [facility] for your care. Our goal is always to provide you with excellent care. Hearing back from our patients is one way we can continue to improve our services. Please take a few minutes to complete the written survey that you may receive in the mail after you visit with us. Thank you!        Patient Information     Date Of Birth          1970        About your hospital stay     You were admitted on:  March 13, 2017 You last received care in the:  [address] [place] [address]    You were discharged on:  March 16, 2017       Who to Call     For medical emergencies, please call 911.  For non-urgent questions about your medical care, please call your primary care provider or clinic, [phone]  For questions related to your surgery, please call your surgery clinic        Attending Provider     Provider Cheryl Veloz MD Thoracic Diseases       Primary Care Provider Office Phone # Fax #    Noemy FredymiALEKSANDAR dai [phone]       [facility] [address] [facility] [address]        After Care Instructions     Discharge Instructions       THORACIC SURGERY DISCHARGE INSTRUCTIONS    DIET: Full liquid diet at time of discharge.  Follow post Nissen diet recommendations as discussed with dietician and detailed in provided handout    Take the medications in solution form provided by the pharmacy, after these solutions run out, you may return to taking them in tablet or capsule form from your home supply.     Given your recent procedure to correct or minimize your reflux, you should no longer need to take your anti-acid medication regularly    If your plans upon discharge include prolonged periods of sitting (i.e a lengthy car or plane ride), it is highly beneficial to get up and walk at least once per hour to help  prevent swelling and blood clots.     You may remove chest tube dressing 48 hours after tube removal and bandage the site at your own discretion thereafter.  Small amounts of leakage are normal for 2-3 days after removal.  Feel free to call with questions.    You may get incision wet 2 days after operation. Do not submerge, soak, or scrub incision or swim until seen in follow-up.    Take incentive spirometer home for continued frequent use    Activity as tolerated, no strenous activity until seen in follow-up, no lifting greater than 10 pounds for the next 8-10 weeks.    Stay hydrated. Take over the counter fiber (metamucil or benefiber) and stool softeners (Miralax, docusate or senna) if becoming constipated.     Call for fever greater than 101.5, chills, increased size of incision, red skin around incision, vision changes, muscle strength changes, sensation changes, shortness of breath, or other concerns.    No driving while taking narcotic pain medication.    Transition to ibuprofen or tylenol/acetaminophen for pain control. Do not take tylenol/acetaminophen and acetaminophen containing narcotic (e.g., percocet or vicodin) at the same time. If you have known ulcer problems, or kidney trouble (elevated creatinine) do not take the ibuprofen.    In emergencies, call 911    For other Questions or Concerns;   A.) During weekday working hours (Monday through Friday 8am to 4:30pm)   call [phone] and ask to speak to a clinical nurse specialist.     B.) At nights (after 4:30pm), on weekends, or if urgent call [phone] and   tell the   I would like to page job code [phone], the thoracic surgery   fellow on call, please.   _____________________________________________    PEG TUBE INSTRUCTIONS:    Venting:  -You should vent or temporarily put your tube to gravity bag (or basin) drainage if you experience nausea or bloating.  This is important as it will help to protect the hernia repair.   If you are  uncertain how to do this, please ask your nurse for instruction.    Skin care:  -Change the gauze dressing around your PEG tube daily.  -Check for redness and swelling in the area where the tube goes into your skin.   -Keep the skin around your tube dry and with a split gauze under the flange. If the hole where the tube enters your body is draining fluid, you may need to put barrier cream or antibiotic cream on the skin around your tube after you are done cleaning it. Cover the area with bandages, and call your caregiver.   -If there are no stitches holding your PEG tube in place on your skin, gently turn the tube. This may decrease pressure on your skin, and help prevent an infection. Ask your caregiver if you should turn your PEG tube, and how to do it correctly.     Flushes:  -Flush your feeding tube with 30cc of water twice daily to ensure it does not become plugged  -If administering medications or tube feedings via your tube, make sure to flush with water immediately after use to prevent the tube from plugging                  Follow-up Appointments     Follow Up and recommended labs and tests       1.) Follow up with primary care physician, Noemy Rizo, in 1-2 weeks.  2.) Follow up with a thoracic surgery Clinical Nurse Specialist in Thoracic Surgery clinic in 1 month, prior to which an upper GI should be performed.                  Future tests that were ordered for you     XR Upper GI without KUB                 Pending Results     No orders found from 3/11/2017 to 3/14/2017.            Statement of Approval     Ordered          03/16/17 1447  I have reviewed and agree with all the recommendations and orders detailed in this document.  EFFECTIVE NOW     Approved and electronically signed by:  Steven Jama PA-C             Admission Information     Date & Time Provider Department Dept. Phone    3/13/2017 Cheryl Schilling MD [address] [facility] [place] [phone]      Your Vitals Were     Blood  "Pressure Pulse Temperature Respirations Height Weight    120/73 (BP Location: Left arm) 91 98.1  F (36.7  C) (Oral) 16 1.753 m (5' 9\") 93.6 kg (206 lb 5.6 oz)    Pulse Oximetry BMI (Body Mass Index)                95% 30.47 kg/m2          [employer] Information     [employer] gives you secure access to your electronic health record. If you see a primary care provider, you can also send messages to your care team and make appointments. If you have questions, please call your primary care clinic.  If you do not have a primary care provider, please call [phone] and they will assist you.        Care EveryWhere ID     This is your Care EveryWhere ID. This could be used by other organizations to access your [facility] medical records  [id number]           Review of your medicines      START taking        Dose / Directions    acetaminophen 160 MG/5ML solution   Commonly known as:  TYLENOL   Used for:  Acute post-operative pain        Dose:  975 mg   Take 30.45 mLs (975 mg) by mouth every 8 hours   Quantity:  500 mL   Refills:  0       docusate 50 MG/5ML liquid   Commonly known as:  COLACE   Used for:  Bowel habit changes        Dose:  100 mg   Take 10 mLs (100 mg) by mouth 2 times daily   Quantity:  300 mL   Refills:  0       oxyCODONE 5 MG/5ML solution   Commonly known as:  ROXICODONE   Used for:  Acute post-operative pain        Dose:  5-10 mg   Take 5-10 mLs (5-10 mg) by mouth every 4 hours as needed for moderate to severe pain   Quantity:  350 mL   Refills:  0       sennosides 8.8 MG/5ML syrup   Commonly known as:  SENOKOT   Used for:  Bowel habit changes        Dose:  10 mL   Take 10 mLs by mouth 2 times daily   Quantity:  236 mL   Refills:  0         CONTINUE these medicines which have NOT CHANGED        Dose / Directions    HYDROCHLOROTHIAZIDE PO   Indication:  take half tablet daily        Dose:  25 mg   Take 25 mg by mouth daily   Refills:  0       NORTRIPTYLINE HCL PO        Dose:  25 mg   Take 25 mg by mouth At " Bedtime   Refills:  0       PRAMIPEXOLE DIHYDROCHLORIDE PO        Dose:  0.25 mg   Take 0.25 mg by mouth daily   Refills:  0            Where to get your medicines      These medications were sent to [facility] - [place], [place] - [address]  [address] [address]     Phone:  [phone]     acetaminophen 160 MG/5ML solution    docusate 50 MG/5ML liquid    sennosides 8.8 MG/5ML syrup         Some of these will need a paper prescription and others can be bought over the counter. Ask your nurse if you have questions.     Bring a paper prescription for each of these medications     oxyCODONE 5 MG/5ML solution                Protect others around you: Learn how to safely use, store and throw away your medicines at www.disposemymeds.org.             Medication List: This is a list of all your medications and when to take them. Check marks below indicate your daily home schedule. Keep this list as a reference.      Medications           Morning Afternoon Evening Bedtime As Needed    acetaminophen 160 MG/5ML solution   Commonly known as:  TYLENOL   Take 30.45 mLs (975 mg) by mouth every 8 hours   Last time this was given:  975 mg on 3/16/2017 10:31 AM                                docusate 50 MG/5ML liquid   Commonly known as:  COLACE   Take 10 mLs (100 mg) by mouth 2 times daily   Last time this was given:  100 mg on 3/15/2017  8:37 PM                                HYDROCHLOROTHIAZIDE PO   Take 25 mg by mouth daily                                NORTRIPTYLINE HCL PO   Take 25 mg by mouth At Bedtime   Last time this was given:  25 mg on 3/15/2017  9:55 PM                                oxyCODONE 5 MG/5ML solution   Commonly known as:  ROXICODONE   Take 5-10 mLs (5-10 mg) by mouth every 4 hours as needed for moderate to severe pain   Last time this was given:  10 mg on 3/16/2017  2:44 PM                                PRAMIPEXOLE DIHYDROCHLORIDE PO   Take 0.25 mg by mouth  daily   Last time this was given:  0.25 mg on 3/15/2017  9:55 PM                                sennosides 8.8 MG/5ML syrup   Commonly known as:  SENOKOT   Take 10 mLs by mouth 2 times daily   Last time this was given:  5 mLs on 3/16/2017  7:43 AM

## 2017-03-13 NOTE — PLAN OF CARE
Problem: Goal Outcome Summary  Goal: Goal Outcome Summary  Outcome: No Change  POST OP STATUS     Received report from PACU alert and oriented. Per pt, pain is well managed with the current PCA morphine of 1.0 mg every 10 minutes. Lap sites x 5 with primapore, CDI. Gtube to gravity, no drainage. MIVF at 50 cc/hr.     PLAN: VS, pain, dangle at bedside, void

## 2017-03-13 NOTE — PROGRESS NOTES
"SURGERY POST-OP CHECK NOTE  3/13/2017 5:24 PM     Patient: Loli Pearson  MRN: [id number]    Subjective  No acute events postoperatively. Pain well controlled. Denies nausea, vomiting, chest pain, shortness of breath. Overall patient feeling well. Hasn't voided since OR, has some urge to and plans to get up soon to try. Has tried some water without issues. No other complaints.      Objective  /75 (BP Location: Left arm)  Pulse 80  Temp 98.8  F (37.1  C) (Oral)  Resp 18  Ht 1.753 m (5' 9\")  Wt 93.6 kg (206 lb 5.6 oz)  SpO2 96%  BMI 30.47 kg/m2  General: AAOx4, NAD, lying in bed, appears comfortable  CV: regular rate and rhythm, warm, well perfused  Pulm: breathing comfortably on NC  Abd: soft, appropriately tender to palpation, non-distended, incisions c/d/i with overlying dressings; PEG in place to gravity drainage  Extremities: without edema  Neuro: facial movement grossly symmetric, moving all extremities spontaneously without apparent deficit    UOP postop:  None since OR    Labs:  None      Assessment/Plan  Loli Pearson is a 46 year old female POD#0 s/p laparoscopic hiatal hernia repair with Nissen fundoplication and insertion of PEG tube, doing well.     Neuro: home nortriptyline and pramipexole     - Pain: morphine PCA, oxycodone prn, Tylenol scheduled  CV: HDS, no issues  Pulm: satting on NC, encourage IS, DuoNebs scheduled  FEN/GI:      - IVF: LR @ 50     - Diet: sips of clears     - G tube to gravity     - Bowel regimen: senna-colace     - Nausea control: Zofran scheduled with additional Zofran prn  Renal: no void yet, continue to monitor  ID: afebrile, no abx  PPx: OOB, IS, SCDs, Lovenox to start in am, Protonix  Dispo: 7B      - - - - - - - - - - - - - - - - - -  Saloni Saldivar MD  General Surgery PGY-1            "

## 2017-03-14 ENCOUNTER — APPOINTMENT (OUTPATIENT)
Dept: GENERAL RADIOLOGY | Facility: CLINIC | Age: 47
DRG: 327 | End: 2017-03-14
Attending: STUDENT IN AN ORGANIZED HEALTH CARE EDUCATION/TRAINING PROGRAM
Payer: MEDICARE

## 2017-03-14 LAB
ANION GAP SERPL CALCULATED.3IONS-SCNC: 9 MMOL/L (ref 3–14)
BUN SERPL-MCNC: 8 MG/DL (ref 7–30)
CALCIUM SERPL-MCNC: 8.3 MG/DL (ref 8.5–10.1)
CHLORIDE SERPL-SCNC: 109 MMOL/L (ref 94–109)
CO2 SERPL-SCNC: 25 MMOL/L (ref 20–32)
CREAT SERPL-MCNC: 0.72 MG/DL (ref 0.52–1.04)
ERYTHROCYTE [DISTWIDTH] IN BLOOD BY AUTOMATED COUNT: 16.7 % (ref 10–15)
GFR SERPL CREATININE-BSD FRML MDRD: 87 ML/MIN/1.7M2
GLUCOSE SERPL-MCNC: 111 MG/DL (ref 70–99)
HCT VFR BLD AUTO: 33.6 % (ref 35–47)
HGB BLD-MCNC: 10.7 G/DL (ref 11.7–15.7)
MAGNESIUM SERPL-MCNC: 2.4 MG/DL (ref 1.6–2.3)
MCH RBC QN AUTO: 28.5 PG (ref 26.5–33)
MCHC RBC AUTO-ENTMCNC: 31.8 G/DL (ref 31.5–36.5)
MCV RBC AUTO: 90 FL (ref 78–100)
PHOSPHATE SERPL-MCNC: 2.1 MG/DL (ref 2.5–4.5)
PLATELET # BLD AUTO: 226 10E9/L (ref 150–450)
POTASSIUM SERPL-SCNC: 4.3 MMOL/L (ref 3.4–5.3)
RBC # BLD AUTO: 3.75 10E12/L (ref 3.8–5.2)
SODIUM SERPL-SCNC: 142 MMOL/L (ref 133–144)
WBC # BLD AUTO: 10.2 10E9/L (ref 4–11)

## 2017-03-14 PROCEDURE — A9270 NON-COVERED ITEM OR SERVICE: HCPCS | Mod: GY | Performed by: PHYSICIAN ASSISTANT

## 2017-03-14 PROCEDURE — 25000132 ZZH RX MED GY IP 250 OP 250 PS 637: Mod: GY | Performed by: PHYSICIAN ASSISTANT

## 2017-03-14 PROCEDURE — 25000128 H RX IP 250 OP 636: Performed by: SURGERY

## 2017-03-14 PROCEDURE — 94640 AIRWAY INHALATION TREATMENT: CPT | Mod: 76

## 2017-03-14 PROCEDURE — A9270 NON-COVERED ITEM OR SERVICE: HCPCS | Mod: GY | Performed by: THORACIC SURGERY (CARDIOTHORACIC VASCULAR SURGERY)

## 2017-03-14 PROCEDURE — 40000275 ZZH STATISTIC RCP TIME EA 10 MIN

## 2017-03-14 PROCEDURE — 83735 ASSAY OF MAGNESIUM: CPT | Performed by: THORACIC SURGERY (CARDIOTHORACIC VASCULAR SURGERY)

## 2017-03-14 PROCEDURE — 85027 COMPLETE CBC AUTOMATED: CPT | Performed by: THORACIC SURGERY (CARDIOTHORACIC VASCULAR SURGERY)

## 2017-03-14 PROCEDURE — 25000125 ZZHC RX 250: Performed by: SURGERY

## 2017-03-14 PROCEDURE — 25000132 ZZH RX MED GY IP 250 OP 250 PS 637: Mod: GY | Performed by: SURGERY

## 2017-03-14 PROCEDURE — 84100 ASSAY OF PHOSPHORUS: CPT | Performed by: THORACIC SURGERY (CARDIOTHORACIC VASCULAR SURGERY)

## 2017-03-14 PROCEDURE — 25000128 H RX IP 250 OP 636: Performed by: THORACIC SURGERY (CARDIOTHORACIC VASCULAR SURGERY)

## 2017-03-14 PROCEDURE — 99231 SBSQ HOSP IP/OBS SF/LOW 25: CPT | Performed by: NURSE PRACTITIONER

## 2017-03-14 PROCEDURE — 25000125 ZZHC RX 250: Performed by: STUDENT IN AN ORGANIZED HEALTH CARE EDUCATION/TRAINING PROGRAM

## 2017-03-14 PROCEDURE — 36415 COLL VENOUS BLD VENIPUNCTURE: CPT | Performed by: THORACIC SURGERY (CARDIOTHORACIC VASCULAR SURGERY)

## 2017-03-14 PROCEDURE — 71020 XR CHEST 2 VW: CPT

## 2017-03-14 PROCEDURE — 25800025 ZZH RX 258: Performed by: THORACIC SURGERY (CARDIOTHORACIC VASCULAR SURGERY)

## 2017-03-14 PROCEDURE — 94640 AIRWAY INHALATION TREATMENT: CPT

## 2017-03-14 PROCEDURE — 80048 BASIC METABOLIC PNL TOTAL CA: CPT | Performed by: THORACIC SURGERY (CARDIOTHORACIC VASCULAR SURGERY)

## 2017-03-14 PROCEDURE — 25000132 ZZH RX MED GY IP 250 OP 250 PS 637: Mod: GY | Performed by: THORACIC SURGERY (CARDIOTHORACIC VASCULAR SURGERY)

## 2017-03-14 PROCEDURE — 12000001 ZZH R&B MED SURG/OB UMMC

## 2017-03-14 PROCEDURE — A9270 NON-COVERED ITEM OR SERVICE: HCPCS | Mod: GY | Performed by: SURGERY

## 2017-03-14 PROCEDURE — 40000274 ZZH STATISTIC RCP CONSULT EA 30 MIN

## 2017-03-14 RX ORDER — POTASSIUM CHLORIDE 750 MG/1
20-40 TABLET, EXTENDED RELEASE ORAL
Status: DISCONTINUED | OUTPATIENT
Start: 2017-03-14 | End: 2017-03-16 | Stop reason: HOSPADM

## 2017-03-14 RX ORDER — POTASSIUM CHLORIDE 29.8 MG/ML
20 INJECTION INTRAVENOUS
Status: DISCONTINUED | OUTPATIENT
Start: 2017-03-14 | End: 2017-03-16 | Stop reason: HOSPADM

## 2017-03-14 RX ORDER — POTASSIUM CHLORIDE 7.45 MG/ML
10 INJECTION INTRAVENOUS
Status: DISCONTINUED | OUTPATIENT
Start: 2017-03-14 | End: 2017-03-16 | Stop reason: HOSPADM

## 2017-03-14 RX ORDER — MAGNESIUM SULFATE HEPTAHYDRATE 40 MG/ML
4 INJECTION, SOLUTION INTRAVENOUS EVERY 4 HOURS PRN
Status: DISCONTINUED | OUTPATIENT
Start: 2017-03-14 | End: 2017-03-16 | Stop reason: HOSPADM

## 2017-03-14 RX ORDER — POTASSIUM CHLORIDE 1.5 G/1.58G
20-40 POWDER, FOR SOLUTION ORAL
Status: DISCONTINUED | OUTPATIENT
Start: 2017-03-14 | End: 2017-03-16 | Stop reason: HOSPADM

## 2017-03-14 RX ADMIN — ACETAMINOPHEN 975 MG: 325 TABLET, FILM COATED ORAL at 15:26

## 2017-03-14 RX ADMIN — ONDANSETRON 4 MG: 2 INJECTION INTRAMUSCULAR; INTRAVENOUS at 01:40

## 2017-03-14 RX ADMIN — OXYCODONE HYDROCHLORIDE 5 MG: 5 TABLET ORAL at 20:35

## 2017-03-14 RX ADMIN — ALBUTEROL SULFATE 2.5 MG: 2.5 SOLUTION RESPIRATORY (INHALATION) at 08:23

## 2017-03-14 RX ADMIN — DOCUSATE SODIUM 100 MG: 50 LIQUID ORAL at 12:01

## 2017-03-14 RX ADMIN — NORTRIPTYLINE HYDROCHLORIDE 25 MG: 25 CAPSULE ORAL at 21:53

## 2017-03-14 RX ADMIN — ALBUTEROL SULFATE 2.5 MG: 2.5 SOLUTION RESPIRATORY (INHALATION) at 20:11

## 2017-03-14 RX ADMIN — OXYCODONE HYDROCHLORIDE 5 MG: 5 TABLET ORAL at 15:26

## 2017-03-14 RX ADMIN — SODIUM CHLORIDE, POTASSIUM CHLORIDE, SODIUM LACTATE AND CALCIUM CHLORIDE: 600; 310; 30; 20 INJECTION, SOLUTION INTRAVENOUS at 00:29

## 2017-03-14 RX ADMIN — ALBUTEROL SULFATE 2.5 MG: 2.5 SOLUTION RESPIRATORY (INHALATION) at 12:18

## 2017-03-14 RX ADMIN — OXYCODONE HYDROCHLORIDE 10 MG: 5 TABLET ORAL at 11:30

## 2017-03-14 RX ADMIN — OXYCODONE HYDROCHLORIDE 5 MG: 5 TABLET ORAL at 17:05

## 2017-03-14 RX ADMIN — ALBUTEROL SULFATE 2.5 MG: 2.5 SOLUTION RESPIRATORY (INHALATION) at 16:19

## 2017-03-14 RX ADMIN — DOCUSATE SODIUM 100 MG: 50 LIQUID ORAL at 20:34

## 2017-03-14 RX ADMIN — ACETAMINOPHEN 975 MG: 325 TABLET, FILM COATED ORAL at 01:40

## 2017-03-14 RX ADMIN — SODIUM PHOSPHATE, MONOBASIC, MONOHYDRATE AND SODIUM PHOSPHATE, DIBASIC, ANHYDROUS 15 MMOL: 276; 142 INJECTION, SOLUTION INTRAVENOUS at 14:32

## 2017-03-14 RX ADMIN — SENNOSIDES A AND B 10 ML: 415.36 LIQUID ORAL at 20:34

## 2017-03-14 RX ADMIN — ONDANSETRON 4 MG: 2 INJECTION INTRAMUSCULAR; INTRAVENOUS at 07:14

## 2017-03-14 RX ADMIN — PANTOPRAZOLE SODIUM 40 MG: 40 TABLET, DELAYED RELEASE ORAL at 09:39

## 2017-03-14 RX ADMIN — ENOXAPARIN SODIUM 40 MG: 40 INJECTION SUBCUTANEOUS at 09:39

## 2017-03-14 RX ADMIN — SENNOSIDES AND DOCUSATE SODIUM 2 TABLET: 8.6; 5 TABLET ORAL at 09:40

## 2017-03-14 RX ADMIN — PRAMIPEXOLE DIHYDROCHLORIDE 0.25 MG: 0.12 TABLET ORAL at 21:53

## 2017-03-14 RX ADMIN — ONDANSETRON 4 MG: 2 INJECTION INTRAMUSCULAR; INTRAVENOUS at 11:31

## 2017-03-14 RX ADMIN — ACETAMINOPHEN 975 MG: 325 TABLET, FILM COATED ORAL at 09:40

## 2017-03-14 RX ADMIN — ONDANSETRON 4 MG: 2 INJECTION INTRAMUSCULAR; INTRAVENOUS at 18:39

## 2017-03-14 ASSESSMENT — PAIN DESCRIPTION - DESCRIPTORS
DESCRIPTORS: ACHING;SORE
DESCRIPTORS: ACHING;SORE
DESCRIPTORS: ACHING;SHARP
DESCRIPTORS: ACHING;SORE

## 2017-03-14 NOTE — PROVIDER NOTIFICATION
Notified Resident at 2130 PM regarding low urine output.      Spoke with: Naila Cason    Orders were obtained.    Comments: Continue to monitor. Encourage to void in 3 hours (0030) and do a post void bladder scan.

## 2017-03-14 NOTE — PLAN OF CARE
Problem: Goal Outcome Summary  Goal: Goal Outcome Summary  Outcome: No Change  AVSS. Cont to c/o upper chest pain unchanged since procedure; pain well managed and per pt improving with scheduled tylenol + morphine PCA available q 10 min. Nausea well managed with scheduled IV zofran. 5 lap sites CDI. PEG tube to gravity, site cleansed and rotated. [place] on right side. Voiding with hesitancy, adequate UOP but pt still feels like she has residual; awaiting post void residual bladder scan. Chest x-ray completed this am, awaiting results. Up with SBA. Cont POC.

## 2017-03-14 NOTE — PROGRESS NOTES
"REGIONAL ANESTHESIA PAIN SERVICE  SUBJECTIVE: Pt reports good pain control following B/L TAP nerve block injections for postoperative pain management.  Denies any weakness, paresthesias, circumoral numbness, metallic taste or tinnitus.  Patient is currently without nausea or vomiting.  Pt complains of pain with position changes.     Clinically Aligned Pain Assessment (CAPA):  Comfort (How is your pain?): Comfortably manageable  Change in Pain (Since your last medication/intervention?): About the same  Pain Control (How are your pain treatments working?):  Partially effective pain control      OBJECTIVE:    Blood pressure 110/67, pulse 74, temperature 97  F (36.1  C), temperature source Oral, resp. rate 18, height 1.753 m (5' 9\"), weight 93.6 kg (206 lb 5.6 oz), SpO2 97 %, not currently breastfeeding.      ASSESSMENT/PLAN:    Loli Pearson is a 46 year old female POD #1 s/p  LAPAROSCOPIC HERNIORRHAPHY HIATAL, COMBINED ESOPHAGOSCOPY, GASTROSCOPY, DUODENOSCOPY (EGD), LAPAROTOMY EXPLORATORY, LAPAROSCOPIC NISSEN FUNDOPLICATION, LAPAROSCOPIC GASTROPEXY, LAPAROSCOPIC ASSISTED INSERTION TUBE GASTROSTOMY due to paraesophageal hernia with single shot B/L TAP nerve block injections, bupivacaine 0.25% with epinephrine 1:200,000 40mL, then liposome bupivacaine (Exparel) 1.3% 20mL given on 3/13/17 for postoperative analgesia.  Pt is ambulating without difficulty, no weakness or paresthesias.  No evidence of adverse side effects associated with B/L TAP nerve block injections. Pt is receiving adequate incisional pain control.  Anticipate up to 72 hours of incisional pain control.  Anticipate patient will require opioid/nonopioid analgesics for visceral and muscle pain not controlled with local anesthetic.        - NO other local anesthetic use within 96 hours of liposome bupivacaine (Exparel)  - patient received verbal and written instructions about liposome bupivacaine  - please call if questions or concerns  - discussed plan " with attending anesthesiologist    BRITTANY Pulido CNP  Regional Anesthesia Pain Service  3/14/2017 2:09 PM    24 hour Job Code Pager.  For in-house use only.     Dial * * [phone] and  [place]:  [phone]  [place]: [phone]  Peds:  [phone]  Enter call-back number  May text page using [employer], but NOT [employer].

## 2017-03-14 NOTE — PROGRESS NOTES
THORACIC & FOREGUT SURGERY    S:  No acute overnight events.  Mild nausea relieved by Zofran.  Pt seen at bedside resting comfortably this AM.  - BM/flatus.       O:  Vitals:    03/13/17 1651 03/14/17 0010 03/14/17 0356 03/14/17 0713   BP:  104/63 101/57 111/74   BP Location:   Left arm Left arm   Pulse:  87     Resp:  18 16 16   Temp:  98.7  F (37.1  C) 96.6  F (35.9  C) 96.5  F (35.8  C)   TempSrc:  Oral Axillary Oral   SpO2: 96% 95% 96% 96%   Weight:       Height:           A&Ox3, NAD  Breathing non-labored  RRR  Soft, NDNT, G to gravity  Distal extremities warm    A/P: Loli Pearson is a 46 year old female POD# 1 s/p Laparoscopic hiatal hernia repair with Uriah gastroplasty & Nissen fundoplication.  -Clears today with G tube open  -Convert to PO pain meds, clamp G tube for one hour after meds  -Zofran for nausea  -Likely clamp G tube tomorrow if nausea controlled    Steven Jama PA-C  P: [phone]

## 2017-03-14 NOTE — PLAN OF CARE
Problem: Goal Outcome Summary  Goal: Goal Outcome Summary  Outcome: No Change  VSS. Pain controlled with Tylenol and 1mg Morphine PCA. Nausea controlled with scheduled Zofran and Compazine x1. 5 lap sites CDI. No balderrama, voided 300cc at 2100 with a post void bladder scan of 533. Notified MD, plan to reevaluate at 0030. G-tube to gravity. Up walking curiel with SBA. Hard of hearing on left side. Continue to monitor.

## 2017-03-15 ENCOUNTER — APPOINTMENT (OUTPATIENT)
Dept: OCCUPATIONAL THERAPY | Facility: CLINIC | Age: 47
DRG: 327 | End: 2017-03-15
Attending: STUDENT IN AN ORGANIZED HEALTH CARE EDUCATION/TRAINING PROGRAM
Payer: MEDICARE

## 2017-03-15 ENCOUNTER — APPOINTMENT (OUTPATIENT)
Dept: GENERAL RADIOLOGY | Facility: CLINIC | Age: 47
DRG: 327 | End: 2017-03-15
Attending: STUDENT IN AN ORGANIZED HEALTH CARE EDUCATION/TRAINING PROGRAM
Payer: MEDICARE

## 2017-03-15 LAB — PHOSPHATE SERPL-MCNC: 2.1 MG/DL (ref 2.5–4.5)

## 2017-03-15 PROCEDURE — 40000275 ZZH STATISTIC RCP TIME EA 10 MIN

## 2017-03-15 PROCEDURE — 12000001 ZZH R&B MED SURG/OB UMMC

## 2017-03-15 PROCEDURE — 94640 AIRWAY INHALATION TREATMENT: CPT

## 2017-03-15 PROCEDURE — 25000132 ZZH RX MED GY IP 250 OP 250 PS 637: Mod: GY | Performed by: PHYSICIAN ASSISTANT

## 2017-03-15 PROCEDURE — 97110 THERAPEUTIC EXERCISES: CPT | Mod: GO

## 2017-03-15 PROCEDURE — A9270 NON-COVERED ITEM OR SERVICE: HCPCS | Mod: GY | Performed by: PHYSICIAN ASSISTANT

## 2017-03-15 PROCEDURE — 84100 ASSAY OF PHOSPHORUS: CPT | Performed by: STUDENT IN AN ORGANIZED HEALTH CARE EDUCATION/TRAINING PROGRAM

## 2017-03-15 PROCEDURE — 40000133 ZZH STATISTIC OT WARD VISIT

## 2017-03-15 PROCEDURE — 25000128 H RX IP 250 OP 636: Performed by: THORACIC SURGERY (CARDIOTHORACIC VASCULAR SURGERY)

## 2017-03-15 PROCEDURE — 40000141 ZZH STATISTIC PERIPHERAL IV START W/O US GUIDANCE

## 2017-03-15 PROCEDURE — 25000125 ZZHC RX 250: Performed by: STUDENT IN AN ORGANIZED HEALTH CARE EDUCATION/TRAINING PROGRAM

## 2017-03-15 PROCEDURE — A9270 NON-COVERED ITEM OR SERVICE: HCPCS | Mod: GY | Performed by: SURGERY

## 2017-03-15 PROCEDURE — 71020 XR CHEST 2 VW: CPT

## 2017-03-15 PROCEDURE — 25000132 ZZH RX MED GY IP 250 OP 250 PS 637: Mod: GY | Performed by: SURGERY

## 2017-03-15 PROCEDURE — 25000125 ZZHC RX 250: Performed by: SURGERY

## 2017-03-15 PROCEDURE — 97535 SELF CARE MNGMENT TRAINING: CPT | Mod: GO

## 2017-03-15 PROCEDURE — 97165 OT EVAL LOW COMPLEX 30 MIN: CPT | Mod: GO

## 2017-03-15 PROCEDURE — 25000128 H RX IP 250 OP 636: Performed by: SURGERY

## 2017-03-15 PROCEDURE — 25800025 ZZH RX 258: Performed by: THORACIC SURGERY (CARDIOTHORACIC VASCULAR SURGERY)

## 2017-03-15 PROCEDURE — 25800025 ZZH RX 258: Performed by: SURGERY

## 2017-03-15 PROCEDURE — 94640 AIRWAY INHALATION TREATMENT: CPT | Mod: 76

## 2017-03-15 PROCEDURE — 36415 COLL VENOUS BLD VENIPUNCTURE: CPT | Performed by: STUDENT IN AN ORGANIZED HEALTH CARE EDUCATION/TRAINING PROGRAM

## 2017-03-15 PROCEDURE — 25000132 ZZH RX MED GY IP 250 OP 250 PS 637: Mod: GY | Performed by: STUDENT IN AN ORGANIZED HEALTH CARE EDUCATION/TRAINING PROGRAM

## 2017-03-15 PROCEDURE — A9270 NON-COVERED ITEM OR SERVICE: HCPCS | Mod: GY | Performed by: STUDENT IN AN ORGANIZED HEALTH CARE EDUCATION/TRAINING PROGRAM

## 2017-03-15 PROCEDURE — 25000125 ZZHC RX 250: Performed by: THORACIC SURGERY (CARDIOTHORACIC VASCULAR SURGERY)

## 2017-03-15 PROCEDURE — 97530 THERAPEUTIC ACTIVITIES: CPT | Mod: GO

## 2017-03-15 RX ORDER — OXYCODONE HCL 5 MG/5 ML
5-10 SOLUTION, ORAL ORAL EVERY 4 HOURS PRN
Status: DISCONTINUED | OUTPATIENT
Start: 2017-03-15 | End: 2017-03-16 | Stop reason: HOSPADM

## 2017-03-15 RX ORDER — SODIUM CHLORIDE, SODIUM LACTATE, POTASSIUM CHLORIDE, CALCIUM CHLORIDE 600; 310; 30; 20 MG/100ML; MG/100ML; MG/100ML; MG/100ML
INJECTION, SOLUTION INTRAVENOUS CONTINUOUS
Status: DISCONTINUED | OUTPATIENT
Start: 2017-03-15 | End: 2017-03-16 | Stop reason: HOSPADM

## 2017-03-15 RX ADMIN — ALBUTEROL SULFATE 2.5 MG: 2.5 SOLUTION RESPIRATORY (INHALATION) at 16:03

## 2017-03-15 RX ADMIN — OXYCODONE HYDROCHLORIDE 10 MG: 5 SOLUTION ORAL at 17:51

## 2017-03-15 RX ADMIN — DOCUSATE SODIUM 100 MG: 50 LIQUID ORAL at 20:37

## 2017-03-15 RX ADMIN — ONDANSETRON 4 MG: 2 INJECTION INTRAMUSCULAR; INTRAVENOUS at 01:23

## 2017-03-15 RX ADMIN — ENOXAPARIN SODIUM 40 MG: 40 INJECTION SUBCUTANEOUS at 09:11

## 2017-03-15 RX ADMIN — OXYCODONE HYDROCHLORIDE 5 MG: 5 SOLUTION ORAL at 21:55

## 2017-03-15 RX ADMIN — ONDANSETRON 4 MG: 2 INJECTION INTRAMUSCULAR; INTRAVENOUS at 13:48

## 2017-03-15 RX ADMIN — SENNOSIDES A AND B 10 ML: 415.36 LIQUID ORAL at 20:36

## 2017-03-15 RX ADMIN — OXYCODONE HYDROCHLORIDE 10 MG: 5 SOLUTION ORAL at 13:48

## 2017-03-15 RX ADMIN — ONDANSETRON 4 MG: 2 INJECTION INTRAMUSCULAR; INTRAVENOUS at 08:10

## 2017-03-15 RX ADMIN — ACETAMINOPHEN 975 MG: 160 SUSPENSION ORAL at 01:22

## 2017-03-15 RX ADMIN — ALBUTEROL SULFATE 2.5 MG: 2.5 SOLUTION RESPIRATORY (INHALATION) at 09:06

## 2017-03-15 RX ADMIN — SODIUM CHLORIDE, POTASSIUM CHLORIDE, SODIUM LACTATE AND CALCIUM CHLORIDE: 600; 310; 30; 20 INJECTION, SOLUTION INTRAVENOUS at 01:10

## 2017-03-15 RX ADMIN — SODIUM CHLORIDE, POTASSIUM CHLORIDE, SODIUM LACTATE AND CALCIUM CHLORIDE: 600; 310; 30; 20 INJECTION, SOLUTION INTRAVENOUS at 08:10

## 2017-03-15 RX ADMIN — PANTOPRAZOLE SODIUM 40 MG: 40 INJECTION, POWDER, FOR SOLUTION INTRAVENOUS at 08:10

## 2017-03-15 RX ADMIN — PRAMIPEXOLE DIHYDROCHLORIDE 0.25 MG: 0.12 TABLET ORAL at 21:55

## 2017-03-15 RX ADMIN — SENNOSIDES A AND B 10 ML: 415.36 LIQUID ORAL at 08:09

## 2017-03-15 RX ADMIN — OXYCODONE HYDROCHLORIDE 10 MG: 5 SOLUTION ORAL at 10:10

## 2017-03-15 RX ADMIN — ONDANSETRON 4 MG: 2 INJECTION INTRAMUSCULAR; INTRAVENOUS at 20:37

## 2017-03-15 RX ADMIN — NORTRIPTYLINE HYDROCHLORIDE 25 MG: 25 CAPSULE ORAL at 21:55

## 2017-03-15 RX ADMIN — OXYCODONE HYDROCHLORIDE 10 MG: 5 SOLUTION ORAL at 01:22

## 2017-03-15 RX ADMIN — ALBUTEROL SULFATE 2.5 MG: 2.5 SOLUTION RESPIRATORY (INHALATION) at 20:18

## 2017-03-15 RX ADMIN — SODIUM PHOSPHATE, MONOBASIC, MONOHYDRATE AND SODIUM PHOSPHATE, DIBASIC, ANHYDROUS 15 MMOL: 276; 142 INJECTION, SOLUTION INTRAVENOUS at 14:32

## 2017-03-15 RX ADMIN — OXYCODONE HYDROCHLORIDE 5 MG: 5 SOLUTION ORAL at 06:16

## 2017-03-15 RX ADMIN — ACETAMINOPHEN 975 MG: 160 SUSPENSION ORAL at 17:51

## 2017-03-15 RX ADMIN — ACETAMINOPHEN 975 MG: 160 SUSPENSION ORAL at 09:11

## 2017-03-15 RX ADMIN — DOCUSATE SODIUM 100 MG: 50 LIQUID ORAL at 08:09

## 2017-03-15 RX ADMIN — ALBUTEROL SULFATE 2.5 MG: 2.5 SOLUTION RESPIRATORY (INHALATION) at 12:04

## 2017-03-15 ASSESSMENT — ACTIVITIES OF DAILY LIVING (ADL): PREVIOUS_RESPONSIBILITIES: MEAL PREP;HOUSEKEEPING;LAUNDRY;SHOPPING;MEDICATION MANAGEMENT;FINANCES;DRIVING

## 2017-03-15 ASSESSMENT — PAIN DESCRIPTION - DESCRIPTORS
DESCRIPTORS: ACHING;SHARP
DESCRIPTORS: ACHING
DESCRIPTORS: ACHING;SORE
DESCRIPTORS: ACHING;SHARP

## 2017-03-15 NOTE — PROGRESS NOTES
" 03/15/17 0838   Quick Adds   Type of Visit Initial Occupational Therapy Evaluation   Living Environment   Lives With sibling(s);child(rigoberto), adult  (college-aged son)   Living Arrangements house   Home Accessibility bed and bath on same level;stairs to enter home;stairs within home   Number of Stairs to Enter Home 3   Stair Railings at Home outside, present on left side   Transportation Available car;family or friend will provide   Living Environment Comment Pt lives with significant other and son who commutes to college. All needs on main floor with exception of laundry, which is in basement. Lupis is currently on unemployment and will be home to assist 24/7 as needed.    Self-Care   Dominant Hand other (see comments)  (R hand dominant, but uses L more now 2/2 weakness in R)   Usual Activity Tolerance good   Current Activity Tolerance moderate   Regular Exercise no   Equipment Currently Used at Home none   Activity/Exercise/Self-Care Comment Pt independent in I/ADLs.   Functional Level Prior   Ambulation 0-->independent   Transferring 0-->independent   Toileting 0-->independent   Bathing 0-->independent   Dressing 0-->independent   Eating 0-->independent   Communication 0-->understands/communicates without difficulty   Swallowing 0-->swallows foods/liquids without difficulty   Cognition 0 - no cognition issues reported   Fall history within last six months no   Which of the above functional risks had a recent onset or change? 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 Pt has accommodated to decreased functional use of RUE.    Precautions/Limitations (Thoracic )   General Observations Significant other present and supportive   General Info Comments Activtity: ambulate 4x daily, up in chair 3x daily   Cognitive Status Examination   Orientation orientation to person, place and time   Cognitive Comment no cognitive concers   Visual Perception   Visual Perception Wears glasses   Sensory Examination   Sensory Comments numbness/tinging in RUE that is constant.    Posture   Posture not impaired   Range of Motion (ROM)   ROM Comment BUE WNL   Strength   Strength Comments RUE 4/5, LUE 5/5    Hand Strength   Hand Strength Comments Decreased  in right hand   Coordination   Upper Extremity Coordination No deficits were identified   Mobility   Bed Mobility Bed mobility skill: Supine to sit   Bed Mobility Skill: Supine to Sit   Level of Harper: Supine/Sit stand-by assist   Transfer Skill: Sit to Stand   Level of [place]: Sit/Stand stand-by assist   Transfer Skill: Toilet Transfer   Level of [place]: Toilet stand-by assist   Balance   Balance Quick Add No deficits identified   Lower Body Dressing   Level of [place]: Dress Lower Body stand-by assist   Toileting   Level of [place]: Toilet independent   Grooming   Level of Harper: Grooming stand-by assist   Instrumental Activities of Daily Living (IADL)   Previous Responsibilities meal prep;housekeeping;laundry;shopping;medication management;finances;driving   Activities of Daily Living Analysis   Impairments Contributing to Impaired Activities of Daily Living post surgical precautions;pain;strength decreased   General Therapy Interventions   Planned Therapy Interventions ADL retraining;bed mobility training;strengthening;transfer training;home program guidelines;progressive activity/exercise;risk factor education   Clinical Impression   Criteria for  "Skilled Therapeutic Interventions Met yes, treatment indicated   OT Diagnosis Post-op thoracic surgery; decreased independence in I/ADLs   Influenced by the following impairments pain, surgical precautions, RUE weakness   Assessment of Occupational Performance 1-3 Performance Deficits   Identified Performance Deficits functional mobility, transfers, LB dressing   Clinical Decision Making (Complexity) Low complexity   Therapy Frequency daily   Predicted Duration of Therapy Intervention (days/wks) 1 week   Anticipated Discharge Disposition Home with Assist   Risks and Benefits of Treatment have been explained. Yes   Patient, Family & other staff in agreement with plan of care Yes   [facility] TM \"6 Clicks\"   2016, Trustees of [facility], under license to [employer].  All rights reserved.   6 Clicks Short Forms Daily Activity Inpatient Short Form   [facility]  \"6 Clicks\" Daily Activity Inpatient Short Form   1. Putting on and taking off regular lower body clothing? 3 - A Little   2. Bathing (including washing, rinsing, drying)? 3 - A Little   3. Toileting, which includes using toilet, bedpan or urinal? 4 - None   4. Putting on and taking off regular upper body clothing? 4 - None   5. Taking care of personal grooming such as brushing teeth? 4 - None   6. Eating meals? 4 - None   Daily Activity Raw Score (Score out of 24.Lower scores equate to lower levels of function) 22   Total Evaluation Time   Total Evaluation Time (Minutes) 10     "

## 2017-03-15 NOTE — PROGRESS NOTES
THORACIC & FOREGUT SURGERY    S:  No acute overnight events.  Pt seen at bedside resting comfortably this AM.   Tolerated diet with PEG open.    O:  Vitals:    03/14/17 2006 03/15/17 0039 03/15/17 0200 03/15/17 0629   BP: 103/54 109/66  99/62   BP Location: Left arm Left arm  Left arm   Pulse:  107  94   Resp: 18 18  18   Temp: 99.2  F (37.3  C) 99.1  F (37.3  C)  98.4  F (36.9  C)   TempSrc: Oral Oral  Oral   SpO2: 96% 95% 96% 93%   Weight:       Height:           A&Ox3, NAD  Breathing non-labored  RRR  Soft, NDNT, G to gravity  Distal extremities warm    A/P: Loli Pearson is a 46 year old female POD# 2 s/p Laparoscopic hiatal hernia repair with Uriah gastroplasty & Nissen fundoplication.  -Fulls today with G tube clamped  -Zofran for nausea  -Likely dispo tomorrow if tolerates diet and has +CICI Jama PA-C  P: [phone]

## 2017-03-15 NOTE — PLAN OF CARE
Problem: Goal Outcome Summary  Goal: Goal Outcome Summary  Outcome: Improving  POD#1. AVSS, requiring 1-2L NC intermittently (especially while sleeping) to maintain sats >91%. PCA discontinued this morning, pain managed well with Tylenol and Oxycodone. Clear liquid diet, PEG clamped since 0945 per orders. Pt denies nausea, scheduled Zofran continues. PIV infusing MIVF. Phos 2.1, replaced per orders, recheck tomorrow morning. Pt unable to void this morning, bladder scan for 850cc-- MD notified. Straight cath at 1315 for 1,200cc. Pt able to void spont at 1845, 450cc. Denies passing flatus, no BM yet. Lap sites, covered with primapore dressings, CDI. Pt having epigastric pain intermittently, although improved from earlier this morning. Scheduled nebs, Pt using IS and acapella independently. PCDs on while in bed. SBA, steady on feet. Pt ambulating long distances in curiel. Continue with POC.

## 2017-03-15 NOTE — PLAN OF CARE
Problem: Goal Outcome Summary  Goal: Goal Outcome Summary  OT 7C: OT eval and treatment initiated. Pt educated on thoracic precautions and provided BUE exercises to promote ROM. Pt completed ~400 feet functional mobility progressing from IV pole support to unsupported with no LOB. Pt able to ascend/descend 3 stairs x2 sets with one railing. Pt limited by pain and surgical precautions, and 2 year hx of RUE weakness, numbess/tingliing, and CRPS, which she has learned to accommodate to. Rec: home with assist PRN.  available to assist 24/7 for 2+ weeks.

## 2017-03-15 NOTE — OP NOTE
DATE OF PROCEDURE:  2017      PREOPERATIVE DIAGNOSIS:  Hiatal hernia.      PROCEDURE PERFORMED:   1.  Laparoscopic reduction of hiatal hernia.   2.  Laparoscopic wedge Uriah gastroplasty.   3.  Laparoscopic Nissen fundoplication.   4.  Laparoscopic-assisted gastrostomy tube placement.      SURGEON:  Zamzam Griffin (dictating as cosurgeon with Dr. Schilling, please refer to her operative report)      DESCRIPTION OF PROCEDURE:  I assisted Dr. Schilling in every step of the procedure from taking down the sac to circumferentially dissecting the esophagus all the way to the level of the inferior pulmonary veins.  We also identified that the esophagus was foreshortened and for this reason, I assisted with the performance of the wedge Uriah gastroplasty.  Once we had performed the gastroplasty and completed the hiatal closure, I helped with the Nissen wrap and with the final placement of the gastrostomy tube.         ZAMZAM GRIFFIN MD             D: 2017 20:31   T: 2017 22:52   MT: RUFINO      Name:     GRABIEL CANTOR   MRN:      [id number]        Account:        [id number]   :      1970           Procedure Date: 2017      Document: [id number]       cc: [facility] Surgery Billing

## 2017-03-15 NOTE — PLAN OF CARE
Problem: Goal Outcome Summary  Goal: Goal Outcome Summary  Outcome: Improving       All Vital signs stable. Unable to wean pt of oxygen. Currently on 1.5/L at this time.  Pt complained of abdominal pain and lateral chest pain. PRN pain medication oxycodone and scheduled tylenol effective for pain. Lap sites with prima pore clean dry and intact.  Warm pack also applied on pt's chest and relieved pain. G tube open to gravityper order except clamped for meds. Voiding ample amounts. No gas no BM at this time. Tolerating clear liquid diet.   Pts pills to be crushed.

## 2017-03-15 NOTE — PLAN OF CARE
Problem: Goal Outcome Summary  Goal: Goal Outcome Summary  Outcome: Improving  Assumed care of patient 1930. Intermittent tachycardia noted with activity, OVSS on 1.5L NC. Unable to wean O2 further due to desatting. Pain controlled with scheduled tylenol and prn oxycodone. Denies nausea, tolerating clears. Lap sites with primapore c/d/i. G tube open to gravity per order except clamped for meds. [place] in R ear. Up with minimal assist. Voiding good amounts. No gas, no BM.      Continue POC.

## 2017-03-15 NOTE — PLAN OF CARE
Problem: Goal Outcome Summary  Goal: Goal Outcome Summary  PT - Cancel, per chart review and discussion with interdisciplinary team, pt does not require skilled inpatient physical therapy at this time. Pt walking halls independently without assistive device and completed 3 stairs safely without assist with OT and has met most her goals and likely discharge home tomorrow with 24 hr A of SO for the first few days. Please re-consult as needed if patient experiences a change in functional mobility or goals requiring skilled inpatient physical therapy.

## 2017-03-16 ENCOUNTER — APPOINTMENT (OUTPATIENT)
Dept: OCCUPATIONAL THERAPY | Facility: CLINIC | Age: 47
DRG: 327 | End: 2017-03-16
Attending: STUDENT IN AN ORGANIZED HEALTH CARE EDUCATION/TRAINING PROGRAM
Payer: MEDICARE

## 2017-03-16 ENCOUNTER — APPOINTMENT (OUTPATIENT)
Dept: ULTRASOUND IMAGING | Facility: CLINIC | Age: 47
DRG: 327 | End: 2017-03-16
Attending: PHYSICIAN ASSISTANT
Payer: MEDICARE

## 2017-03-16 VITALS
HEIGHT: 69 IN | RESPIRATION RATE: 16 BRPM | DIASTOLIC BLOOD PRESSURE: 73 MMHG | OXYGEN SATURATION: 95 % | WEIGHT: 206.35 LBS | HEART RATE: 91 BPM | TEMPERATURE: 98.1 F | BODY MASS INDEX: 30.56 KG/M2 | SYSTOLIC BLOOD PRESSURE: 120 MMHG

## 2017-03-16 LAB
ANION GAP SERPL CALCULATED.3IONS-SCNC: 6 MMOL/L (ref 3–14)
BUN SERPL-MCNC: 5 MG/DL (ref 7–30)
CALCIUM SERPL-MCNC: 8.5 MG/DL (ref 8.5–10.1)
CHLORIDE SERPL-SCNC: 107 MMOL/L (ref 94–109)
CO2 SERPL-SCNC: 25 MMOL/L (ref 20–32)
CREAT SERPL-MCNC: 0.57 MG/DL (ref 0.52–1.04)
ERYTHROCYTE [DISTWIDTH] IN BLOOD BY AUTOMATED COUNT: 16.8 % (ref 10–15)
GFR SERPL CREATININE-BSD FRML MDRD: ABNORMAL ML/MIN/1.7M2
GLUCOSE SERPL-MCNC: 105 MG/DL (ref 70–99)
HCT VFR BLD AUTO: 30.4 % (ref 35–47)
HGB BLD-MCNC: 9.5 G/DL (ref 11.7–15.7)
MCH RBC QN AUTO: 27.9 PG (ref 26.5–33)
MCHC RBC AUTO-ENTMCNC: 31.3 G/DL (ref 31.5–36.5)
MCV RBC AUTO: 89 FL (ref 78–100)
PHOSPHATE SERPL-MCNC: 3 MG/DL (ref 2.5–4.5)
PLATELET # BLD AUTO: 196 10E9/L (ref 150–450)
PLATELET # BLD AUTO: 196 10E9/L (ref 150–450)
POTASSIUM SERPL-SCNC: 3.6 MMOL/L (ref 3.4–5.3)
RBC # BLD AUTO: 3.4 10E12/L (ref 3.8–5.2)
SODIUM SERPL-SCNC: 138 MMOL/L (ref 133–144)
WBC # BLD AUTO: 6 10E9/L (ref 4–11)

## 2017-03-16 PROCEDURE — A9270 NON-COVERED ITEM OR SERVICE: HCPCS | Mod: GY | Performed by: SURGERY

## 2017-03-16 PROCEDURE — 94640 AIRWAY INHALATION TREATMENT: CPT

## 2017-03-16 PROCEDURE — 40000133 ZZH STATISTIC OT WARD VISIT

## 2017-03-16 PROCEDURE — 94640 AIRWAY INHALATION TREATMENT: CPT | Mod: 76

## 2017-03-16 PROCEDURE — 25000125 ZZHC RX 250: Performed by: STUDENT IN AN ORGANIZED HEALTH CARE EDUCATION/TRAINING PROGRAM

## 2017-03-16 PROCEDURE — 84100 ASSAY OF PHOSPHORUS: CPT | Performed by: STUDENT IN AN ORGANIZED HEALTH CARE EDUCATION/TRAINING PROGRAM

## 2017-03-16 PROCEDURE — 85027 COMPLETE CBC AUTOMATED: CPT | Performed by: PHYSICIAN ASSISTANT

## 2017-03-16 PROCEDURE — 25000132 ZZH RX MED GY IP 250 OP 250 PS 637: Mod: GY | Performed by: STUDENT IN AN ORGANIZED HEALTH CARE EDUCATION/TRAINING PROGRAM

## 2017-03-16 PROCEDURE — 25800025 ZZH RX 258: Performed by: THORACIC SURGERY (CARDIOTHORACIC VASCULAR SURGERY)

## 2017-03-16 PROCEDURE — A9270 NON-COVERED ITEM OR SERVICE: HCPCS | Mod: GY | Performed by: PHYSICIAN ASSISTANT

## 2017-03-16 PROCEDURE — 80048 BASIC METABOLIC PNL TOTAL CA: CPT | Performed by: STUDENT IN AN ORGANIZED HEALTH CARE EDUCATION/TRAINING PROGRAM

## 2017-03-16 PROCEDURE — A9270 NON-COVERED ITEM OR SERVICE: HCPCS | Mod: GY | Performed by: STUDENT IN AN ORGANIZED HEALTH CARE EDUCATION/TRAINING PROGRAM

## 2017-03-16 PROCEDURE — 25000132 ZZH RX MED GY IP 250 OP 250 PS 637: Mod: GY | Performed by: SURGERY

## 2017-03-16 PROCEDURE — 97110 THERAPEUTIC EXERCISES: CPT | Mod: GO

## 2017-03-16 PROCEDURE — 93970 EXTREMITY STUDY: CPT

## 2017-03-16 PROCEDURE — 25000132 ZZH RX MED GY IP 250 OP 250 PS 637: Mod: GY | Performed by: PHYSICIAN ASSISTANT

## 2017-03-16 PROCEDURE — 25000128 H RX IP 250 OP 636: Performed by: SURGERY

## 2017-03-16 PROCEDURE — 25000128 H RX IP 250 OP 636: Performed by: THORACIC SURGERY (CARDIOTHORACIC VASCULAR SURGERY)

## 2017-03-16 PROCEDURE — 25000125 ZZHC RX 250: Performed by: THORACIC SURGERY (CARDIOTHORACIC VASCULAR SURGERY)

## 2017-03-16 PROCEDURE — 97535 SELF CARE MNGMENT TRAINING: CPT | Mod: GO

## 2017-03-16 RX ORDER — OXYCODONE HCL 5 MG/5 ML
5-10 SOLUTION, ORAL ORAL EVERY 4 HOURS PRN
Qty: 350 ML | Refills: 0 | Status: SHIPPED | OUTPATIENT
Start: 2017-03-16 | End: 2018-09-06

## 2017-03-16 RX ADMIN — ALBUTEROL SULFATE 2.5 MG: 2.5 SOLUTION RESPIRATORY (INHALATION) at 12:01

## 2017-03-16 RX ADMIN — POTASSIUM CHLORIDE 20 MEQ: 1.5 POWDER, FOR SOLUTION ORAL at 11:41

## 2017-03-16 RX ADMIN — PANTOPRAZOLE SODIUM 40 MG: 40 INJECTION, POWDER, FOR SOLUTION INTRAVENOUS at 07:43

## 2017-03-16 RX ADMIN — OXYCODONE HYDROCHLORIDE 10 MG: 5 SOLUTION ORAL at 14:44

## 2017-03-16 RX ADMIN — SENNOSIDES A AND B 5 ML: 415.36 LIQUID ORAL at 07:43

## 2017-03-16 RX ADMIN — ONDANSETRON 4 MG: 2 INJECTION INTRAMUSCULAR; INTRAVENOUS at 02:06

## 2017-03-16 RX ADMIN — ONDANSETRON 4 MG: 2 INJECTION INTRAMUSCULAR; INTRAVENOUS at 07:44

## 2017-03-16 RX ADMIN — ALBUTEROL SULFATE 2.5 MG: 2.5 SOLUTION RESPIRATORY (INHALATION) at 08:43

## 2017-03-16 RX ADMIN — OXYCODONE HYDROCHLORIDE 5 MG: 5 SOLUTION ORAL at 07:20

## 2017-03-16 RX ADMIN — ENOXAPARIN SODIUM 40 MG: 40 INJECTION SUBCUTANEOUS at 07:44

## 2017-03-16 RX ADMIN — OXYCODONE HYDROCHLORIDE 5 MG: 5 SOLUTION ORAL at 10:38

## 2017-03-16 RX ADMIN — ACETAMINOPHEN 975 MG: 160 SUSPENSION ORAL at 02:02

## 2017-03-16 RX ADMIN — OXYCODONE HYDROCHLORIDE 5 MG: 5 SOLUTION ORAL at 00:37

## 2017-03-16 RX ADMIN — ONDANSETRON 4 MG: 4 TABLET, ORALLY DISINTEGRATING ORAL at 13:24

## 2017-03-16 RX ADMIN — ACETAMINOPHEN 975 MG: 160 SUSPENSION ORAL at 10:31

## 2017-03-16 RX ADMIN — SODIUM CHLORIDE, POTASSIUM CHLORIDE, SODIUM LACTATE AND CALCIUM CHLORIDE 500 ML: 600; 310; 30; 20 INJECTION, SOLUTION INTRAVENOUS at 09:05

## 2017-03-16 ASSESSMENT — PAIN DESCRIPTION - DESCRIPTORS
DESCRIPTORS: ACHING;SORE
DESCRIPTORS: SHARP
DESCRIPTORS: SHARP
DESCRIPTORS: ACHING;DULL

## 2017-03-16 NOTE — PLAN OF CARE
Problem: Goal Outcome Summary  Goal: Goal Outcome Summary  OT 7C: Pt educated on log roll technique for bed mobility, progressing from CGA to mod I with HOB flat. Pt also educated on AE (long handled sponge, toilet tongs, reacher) for ADLs to increase participation while adhering to abdominal precautions. Pt meeting all OT goals with no further concerns. Recommending d/c to home with assist as needed from  who will be available 24/7 for 2+ weeks.      Occupational Therapy Discharge Summary     Reason for therapy discharge:    All goals and outcomes met, no further needs identified.     Progress towards therapy goal(s). See goals on Care Plan in [facility] electronic health record for goal details.  Goals met     Therapy recommendation(s):    Continue home exercise program.

## 2017-03-16 NOTE — PLAN OF CARE
Problem: Goal Outcome Summary  Goal: Goal Outcome Summary  Outcome: Adequate for Discharge Date Met:  03/16/17  Patient was discharged to home @ 1630 accompanied by her spouse.  All instructions including diet, meds, G-tube care and activities were reviewed with patient and spouse.  She demonstrated proper way to flush and care for G-tube.  Both verbalized understanding of all discharge instructions.

## 2017-03-16 NOTE — PLAN OF CARE
Problem: Goal Outcome Summary  Goal: Goal Outcome Summary  Outcome: Improving  POD#3. Tachycardic with HR 90s-110. OVSS, on RA. 500cc bolus given this morning due to tachycardia-- HR still elevated this afternoon. Team aware, order for bilat LE ultrasound. Intermittent achy chest/epigastric pain with deep breaths/coughing, and abdominal pain-- managed well with Tylenol and Oxycodone. Full liquid diet, tolerating well. PEG-tube clamped. Pt denies nausea, scheduled Zofran continues. PIV SL. Potassium 3.6, replaced orally. Voiding spont, adequate UOP. Passing flatus. Pt reports having 2 loose BMs early this morning (previous shift), no BM this shift. Lap sites with therese EMANUEL. Scheduled nebs, Pt using IS and acapella independently. PCDs on while in bed. UAL, steady on feet. Ambulated long distances in curiel x2. Pt spoke with dietician about nissen fundoplication diet instructions.  at bedside, attentive and supportive. Possible discharge this afternoon. Continue with POC.     Addendum: Orders placed for discharge.

## 2017-03-16 NOTE — PLAN OF CARE
Problem: Goal Outcome Summary  Goal: Goal Outcome Summary  Outcome: Improving          All vital signs stable. Pt slept through out the night on room air.  Denies nausea or dizziness. Complained of lateral chest pain on deep inspiration. Scheduled pain medication tylenol  and prn oxycodone effective for pain. Pt up ad courtney self transferring herself to bathroom x3 during the night with no difficulty. Voiding ample amounts of gomez colored urine.  Passing gas and having BMs. Gastro tube remains in place and clamped. Continues to tolerate full liquid diet. Will continue with plan of care

## 2017-03-16 NOTE — PLAN OF CARE
Problem: Goal Outcome Summary  Goal: Goal Outcome Summary  Outcome: Improving  Assumed care of patient 1930. Intermittent tachycardia noted with activity, OVSS on 1.5L NC. Unable to wean O2 further due to desatting. Uses IS and Acapella independently, performs well. Pain controlled with scheduled tylenol and prn oxycodone. Denies nausea, tolerating full liquid. Lap sites with primapore c/d/i. G tube clamped. [place] in R ear. Up with minimal assist and ambulating hallways independently with . Voiding good amounts. Passing gas, having BMs. All pills crushed.       Continue POC.

## 2017-03-16 NOTE — DISCHARGE SUMMARY
NAME: Loli Pearson   MRN: [id number]   : 1970     DATE OF ADMISSION: 3/13/2017     PRE/POSTOPERATIVE DIAGNOSES: Hiatal hernia.     PROCEDURES PERFORMED:   1. Laparoscopic reduction of hiatal hernia.   2. Laparoscopic wedge Uriah gastroplasty.   3. Laparoscopic Nissen fundoplication.   4. Laparoscopic-assisted gastrostomy tube placement.     CULTURE RESULTS: None     INTRAOPERATIVE COMPLICATIONS: None     POSTOPERATIVE COMPLICATIONS: None     DRAINS/TUBES PRESENT AT DISCHARGE: PEG tube for gastropexy and PRN decompression     DATE OF DISCHARGE:  2017     HOSPITAL COURSE: Loli Pearson is a 46 year old female who on 3/13/2017 underwent the above-named procedures.  She tolerated the operation well and postoperatively was transferred to the general post-surgical unit.  The remainder of her course was essentially uncomplicated.  Prior to discharge, her pain was controlled well, she was able to perform ADLs and ambulate independently without difficulty, and had full return of bowel and bladder function.  On 2017, she was discharged to home in stable condition with her PEG tube securely in place.    DISCHARGE EXAM:   A&o, AF/VSS  Resp non-labored  ND/NT, PEG site healthy  Distal extremities warm    Incisions healing well     DISCHARGE INSTRUCTIONS:    Discharge Procedure Orders  XR Upper GI without KUB   Standing Status: Future  Standing Exp. Date: 17   Order Specific Question Answer Comments   Is patient pregnant? Pregnancy Unknown at time of ordering      Follow Up and recommended labs and tests   Order Comments: 1.) Follow up with primary care physician, Noemy Rizo, in 1-2 weeks.  2.) Follow up with a thoracic surgery Clinical Nurse Specialist in Thoracic Surgery clinic in 1 month, prior to which an upper GI should be performed.     Discharge Instructions   Order Comments: THORACIC SURGERY DISCHARGE INSTRUCTIONS    DIET: Full liquid diet at time of discharge.  Follow  post Nissen diet recommendations as discussed with dietician and detailed in provided handout    Take the medications in solution form provided by the pharmacy, after these solutions run out, you may return to taking them in tablet or capsule form from your home supply.     Given your recent procedure to correct or minimize your reflux, you should no longer need to take your anti-acid medication regularly    If your plans upon discharge include prolonged periods of sitting (i.e a lengthy car or plane ride), it is highly beneficial to get up and walk at least once per hour to help prevent swelling and blood clots.     You may remove chest tube dressing 48 hours after tube removal and bandage the site at your own discretion thereafter.  Small amounts of leakage are normal for 2-3 days after removal.  Feel free to call with questions.    You may get incision wet 2 days after operation. Do not submerge, soak, or scrub incision or swim until seen in follow-up.    Take incentive spirometer home for continued frequent use    Activity as tolerated, no strenous activity until seen in follow-up, no lifting greater than 10 pounds for the next 8-10 weeks.    Stay hydrated. Take over the counter fiber (metamucil or benefiber) and stool softeners (Miralax, docusate or senna) if becoming constipated.     Call for fever greater than 101.5, chills, increased size of incision, red skin around incision, vision changes, muscle strength changes, sensation changes, shortness of breath, or other concerns.    No driving while taking narcotic pain medication.    Transition to ibuprofen or tylenol/acetaminophen for pain control. Do not take tylenol/acetaminophen and acetaminophen containing narcotic (e.g., percocet or vicodin) at the same time. If you have known ulcer problems, or kidney trouble (elevated creatinine) do not take the ibuprofen.    In emergencies, call 911    For other Questions or Concerns;  A.) During weekday working hours  (Monday through Friday 8am to 4:30pm)  call [phone] and ask to speak to a clinical nurse specialist.    TIFFANI) At nights (after 4:30pm), on weekends, or if urgent call [phone] and  tell the   I would like to page job code [phone], the thoracic surgery  fellow on call, please.   _____________________________________________    PEG TUBE INSTRUCTIONS:    Venting:  -You should vent or temporarily put your tube to gravity bag (or basin) drainage if you experience nausea or bloating.  This is important as it will help to protect the hernia repair.   If you are uncertain how to do this, please ask your nurse for instruction.    Skin care:  -Change the gauze dressing around your PEG tube daily.  -Check for redness and swelling in the area where the tube goes into your skin.   -Keep the skin around your tube dry and with a split gauze under the flange. If the hole where the tube enters your body is draining fluid, you may need to put barrier cream or antibiotic cream on the skin around your tube after you are done cleaning it. Cover the area with bandages, and call your caregiver.   -If there are no stitches holding your PEG tube in place on your skin, gently turn the tube. This may decrease pressure on your skin, and help prevent an infection. Ask your caregiver if you should turn your PEG tube, and how to do it correctly.     Flushes:  -Flush your feeding tube with 30cc of water twice daily to ensure it does not become plugged  -If administering medications or tube feedings via your tube, make sure to flush with water immediately after use to prevent the tube from plugging         DISCHARGE MEDICATIONS:    Loli Pearson   Home Medication Instructions NGOC:[id number]    Printed on:03/16/17 1537   Medication Information                      acetaminophen (TYLENOL) 160 MG/5ML solution  Take 30.45 mLs (975 mg) by mouth every 8 hours             docusate (COLACE) 50 MG/5ML liquid  Take 10 mLs (100 mg) by  mouth 2 times daily             HYDROCHLOROTHIAZIDE PO  Take 25 mg by mouth daily             NORTRIPTYLINE HCL PO  Take 25 mg by mouth At Bedtime             oxyCODONE (ROXICODONE) 5 MG/5ML solution  Take 5-10 mLs (5-10 mg) by mouth every 4 hours as needed for moderate to severe pain             PRAMIPEXOLE DIHYDROCHLORIDE PO  Take 0.25 mg by mouth daily             sennosides (SENOKOT) 8.8 MG/5ML syrup  Take 10 mLs by mouth 2 times daily

## 2017-03-16 NOTE — PROGRESS NOTES
CLINICAL NUTRITION SERVICES  Reason for Assessment:  Nutrition education regarding Diet guidelines for a Nissen fundoplication  Nutrition Diagnosis:  Food- and nutrition-related knowledge deficit r/t no previous knowledge of diet guidelines post nissen fundoplication AEB patient verbalization  Interventions:  Provided instruction on diet guidelines post nissen fundoplication. Did not give timeline for each phase of diet-team will need to clarify this prior to DC.  Provided handouts: Diet guidelines for a Nissen Fundoplication  Goals:   Patient will verbalize understanding of  diet guidelines post nissen fundoplication.  Follow-up:    Patient to ask any further nutrition-related questions before discharge.  In addition, pt may request outpatient RD appointment.  Reba Pino RD, LD  Unit pager: [phone]

## 2017-03-17 ENCOUNTER — CARE COORDINATION (OUTPATIENT)
Dept: CARDIOLOGY | Facility: CLINIC | Age: 47
End: 2017-03-17

## 2017-03-17 DIAGNOSIS — K59.03 CONSTIPATION DUE TO PAIN MEDICATION THERAPY: Primary | ICD-10-CM

## 2017-03-17 RX ORDER — BISACODYL 10 MG
10 SUPPOSITORY, RECTAL RECTAL DAILY PRN
Qty: 3 SUPPOSITORY | Refills: 0 | Status: SHIPPED | OUTPATIENT
Start: 2017-03-17 | End: 2017-07-13

## 2017-03-17 NOTE — PROGRESS NOTES
"[facility]  \"Hello, my name is Kalyani Reddy , and I am calling from the [facility].  I want to check in and see how you are doing, after leaving the hospital.  You may also receive a call from your Care Coordinator (care team), but I want to make sure you don t have any urgent needs.  I have a couple questions to review with you:     Post-Discharge Outreach                                                    Loli Pearson is a 46 year old female     Follow-up Appointments           Follow Up and recommended labs and tests       1.) Follow up with primary care physician, Noemy Rizo, in 1-2 weeks.  2.) Follow up with a thoracic surgery Clinical Nurse Specialist in Thoracic Surgery clinic in 1 month, prior to which an upper GI should be performed.                 Care Team:    Patient Care Team       Relationship Specialty Notifications Start End    Noemy Rizo, ALEKSANDAR PCP - General   1/19/17     Phone: [phone] Fax: [phone]         [facility] [address] [facility] [address]            Transition of Care Review                                                      Patient was called three times and no answer so post 24 hr DC follow up calls will be closed out, message was left with contact number for department seen by or following up with                  Plan                                                      Thanks for your time.  Your Care Coordinator may follow-up within the next couple days.  In the meantime if you have questions, concerns or problems call your care team.        Kalyani Reddy    "

## 2017-03-17 NOTE — PROGRESS NOTES
"Patient called saying she was feeling constipated, bloated.  Her last BM was yesterday \"about 3pm\".  She is taking liquid senna and dulcolax.   I suggested she add Miralax to regimin for as long as she was taking narcotics.   She was tearful and asked what she could do now.   I will prescribe dulcolax suppository for her to get more immediate relief.      In addition, she said she was taught to self-inject a blood thinner while in the hospital and told by a nurse that she would need to do this for 21 days but she never got Rx for anything.   I reviewed her prior history (she denies having blood clots or clotting issues in past) and discussed with Dr. Griffin and Patel Jama PA-C.   Neither felt she needed to take enoxaparin injections at home and were unable to explain why she received this instruction.   I shared that with the patient and told her no injections were needed.   She was relieved and agrees with above plan.  "

## 2017-03-18 ENCOUNTER — TRANSFERRED RECORDS (OUTPATIENT)
Dept: HEALTH INFORMATION MANAGEMENT | Facility: CLINIC | Age: 47
End: 2017-03-18

## 2017-03-18 ENCOUNTER — HOSPITAL ENCOUNTER (OUTPATIENT)
Facility: CLINIC | Age: 47
Setting detail: OBSERVATION
Discharge: HOME | End: 2017-03-20
Attending: FAMILY MEDICINE | Admitting: STUDENT IN AN ORGANIZED HEALTH CARE EDUCATION/TRAINING PROGRAM
Payer: MEDICARE

## 2017-03-18 DIAGNOSIS — K56.7 ILEUS FOLLOWING GASTROINTESTINAL SURGERY (H): ICD-10-CM

## 2017-03-18 DIAGNOSIS — K56.0 ADYNAMIC ILEUS (H): ICD-10-CM

## 2017-03-18 DIAGNOSIS — Z93.1 S/P NISSEN FUNDOPLICATION (WITH GASTROSTOMY TUBE PLACEMENT) (H): ICD-10-CM

## 2017-03-18 DIAGNOSIS — K91.89 ILEUS FOLLOWING GASTROINTESTINAL SURGERY (H): ICD-10-CM

## 2017-03-18 LAB
CREAT SERPL-MCNC: 0.69 MG/DL (ref 0.52–1.04)
GFR SERPL CREATININE-BSD FRML MDRD: NORMAL ML/MIN/1.7M2
PLATELET # BLD AUTO: 245 10E9/L (ref 150–450)

## 2017-03-18 PROCEDURE — A9270 NON-COVERED ITEM OR SERVICE: HCPCS | Mod: GY | Performed by: THORACIC SURGERY (CARDIOTHORACIC VASCULAR SURGERY)

## 2017-03-18 PROCEDURE — G0378 HOSPITAL OBSERVATION PER HR: HCPCS

## 2017-03-18 PROCEDURE — 25000132 ZZH RX MED GY IP 250 OP 250 PS 637: Mod: GY | Performed by: THORACIC SURGERY (CARDIOTHORACIC VASCULAR SURGERY)

## 2017-03-18 PROCEDURE — 25000132 ZZH RX MED GY IP 250 OP 250 PS 637: Mod: GY | Performed by: FAMILY MEDICINE

## 2017-03-18 PROCEDURE — 96361 HYDRATE IV INFUSION ADD-ON: CPT

## 2017-03-18 PROCEDURE — 96360 HYDRATION IV INFUSION INIT: CPT

## 2017-03-18 PROCEDURE — 82565 ASSAY OF CREATININE: CPT | Performed by: FAMILY MEDICINE

## 2017-03-18 PROCEDURE — 99285 EMERGENCY DEPT VISIT HI MDM: CPT | Mod: 25 | Performed by: FAMILY MEDICINE

## 2017-03-18 PROCEDURE — 99207 ZZC APP CREDIT; MD BILLING SHARED VISIT: CPT | Mod: Z6 | Performed by: FAMILY MEDICINE

## 2017-03-18 PROCEDURE — 85049 AUTOMATED PLATELET COUNT: CPT | Performed by: FAMILY MEDICINE

## 2017-03-18 PROCEDURE — 25800025 ZZH RX 258: Performed by: THORACIC SURGERY (CARDIOTHORACIC VASCULAR SURGERY)

## 2017-03-18 PROCEDURE — A9270 NON-COVERED ITEM OR SERVICE: HCPCS | Mod: GY | Performed by: FAMILY MEDICINE

## 2017-03-18 RX ORDER — HYDROCHLOROTHIAZIDE 12.5 MG/1
12.5 CAPSULE ORAL DAILY
Status: DISCONTINUED | OUTPATIENT
Start: 2017-03-18 | End: 2017-03-20 | Stop reason: HOSPADM

## 2017-03-18 RX ORDER — OXYCODONE HYDROCHLORIDE 5 MG/1
5-10 TABLET ORAL
Status: DISCONTINUED | OUTPATIENT
Start: 2017-03-18 | End: 2017-03-18 | Stop reason: CLARIF

## 2017-03-18 RX ORDER — SENNOSIDES 8.6 MG
8.6 TABLET ORAL 2 TIMES DAILY
Status: DISCONTINUED | OUTPATIENT
Start: 2017-03-18 | End: 2017-03-18

## 2017-03-18 RX ORDER — ONDANSETRON 2 MG/ML
4 INJECTION INTRAMUSCULAR; INTRAVENOUS EVERY 6 HOURS PRN
Status: DISCONTINUED | OUTPATIENT
Start: 2017-03-18 | End: 2017-03-20 | Stop reason: HOSPADM

## 2017-03-18 RX ORDER — NALOXONE HYDROCHLORIDE 0.4 MG/ML
.1-.4 INJECTION, SOLUTION INTRAMUSCULAR; INTRAVENOUS; SUBCUTANEOUS
Status: DISCONTINUED | OUTPATIENT
Start: 2017-03-18 | End: 2017-03-20 | Stop reason: HOSPADM

## 2017-03-18 RX ORDER — DOCUSATE SODIUM 100 MG/1
100 CAPSULE, LIQUID FILLED ORAL 2 TIMES DAILY
Status: DISCONTINUED | OUTPATIENT
Start: 2017-03-18 | End: 2017-03-18

## 2017-03-18 RX ORDER — ONDANSETRON 4 MG/1
4 TABLET, ORALLY DISINTEGRATING ORAL EVERY 6 HOURS PRN
Status: DISCONTINUED | OUTPATIENT
Start: 2017-03-18 | End: 2017-03-20 | Stop reason: HOSPADM

## 2017-03-18 RX ORDER — SODIUM CHLORIDE, SODIUM LACTATE, POTASSIUM CHLORIDE, CALCIUM CHLORIDE 600; 310; 30; 20 MG/100ML; MG/100ML; MG/100ML; MG/100ML
INJECTION, SOLUTION INTRAVENOUS CONTINUOUS
Status: DISCONTINUED | OUTPATIENT
Start: 2017-03-18 | End: 2017-03-19

## 2017-03-18 RX ORDER — OXYCODONE HCL 5 MG/5 ML
5-10 SOLUTION, ORAL ORAL
Status: DISCONTINUED | OUTPATIENT
Start: 2017-03-18 | End: 2017-03-19

## 2017-03-18 RX ORDER — POTASSIUM CHLORIDE 750 MG/1
30 TABLET, EXTENDED RELEASE ORAL ONCE
Status: DISCONTINUED | OUTPATIENT
Start: 2017-03-18 | End: 2017-03-18

## 2017-03-18 RX ORDER — ACETAMINOPHEN 325 MG/1
650 TABLET ORAL EVERY 6 HOURS PRN
Status: DISCONTINUED | OUTPATIENT
Start: 2017-03-18 | End: 2017-03-18 | Stop reason: CLARIF

## 2017-03-18 RX ORDER — NORTRIPTYLINE HCL 25 MG
25 CAPSULE ORAL AT BEDTIME
Status: DISCONTINUED | OUTPATIENT
Start: 2017-03-18 | End: 2017-03-20 | Stop reason: HOSPADM

## 2017-03-18 RX ORDER — PRAMIPEXOLE DIHYDROCHLORIDE 0.25 MG/1
0.25 TABLET ORAL DAILY
Status: DISCONTINUED | OUTPATIENT
Start: 2017-03-18 | End: 2017-03-20 | Stop reason: HOSPADM

## 2017-03-18 RX ORDER — POTASSIUM CHLORIDE 1.5 G/1.58G
30 POWDER, FOR SOLUTION ORAL ONCE
Status: COMPLETED | OUTPATIENT
Start: 2017-03-18 | End: 2017-03-18

## 2017-03-18 RX ORDER — MORPHINE SULFATE 2 MG/ML
2-4 INJECTION, SOLUTION INTRAMUSCULAR; INTRAVENOUS
Status: DISCONTINUED | OUTPATIENT
Start: 2017-03-18 | End: 2017-03-19

## 2017-03-18 RX ADMIN — SENNOSIDES A AND B 5 ML: 415.36 LIQUID ORAL at 19:52

## 2017-03-18 RX ADMIN — POTASSIUM CHLORIDE 30 MEQ: 1.5 POWDER, FOR SOLUTION ORAL at 14:41

## 2017-03-18 RX ADMIN — OXYCODONE HYDROCHLORIDE 10 MG: 5 TABLET ORAL at 16:24

## 2017-03-18 RX ADMIN — DOCUSATE SODIUM 286 ML: 50 LIQUID ORAL at 16:39

## 2017-03-18 RX ADMIN — SODIUM CHLORIDE, POTASSIUM CHLORIDE, SODIUM LACTATE AND CALCIUM CHLORIDE: 600; 310; 30; 20 INJECTION, SOLUTION INTRAVENOUS at 16:34

## 2017-03-18 RX ADMIN — PRAMIPEXOLE DIHYDROCHLORIDE 0.25 MG: 0.25 TABLET ORAL at 19:53

## 2017-03-18 RX ADMIN — DOCUSATE SODIUM 100 MG: 50 LIQUID ORAL at 19:52

## 2017-03-18 ASSESSMENT — ENCOUNTER SYMPTOMS
DYSPHORIC MOOD: 1
BLOOD IN STOOL: 0
APPETITE CHANGE: 1
DYSURIA: 0
CHEST TIGHTNESS: 0
NAUSEA: 1
EYE REDNESS: 0
VOMITING: 0
FEVER: 0
LIGHT-HEADEDNESS: 0
SHORTNESS OF BREATH: 0
WEAKNESS: 1
TROUBLE SWALLOWING: 0
WOUND: 1
ACTIVITY CHANGE: 0
HEADACHES: 0
ARTHRALGIAS: 0
NECK STIFFNESS: 0
CONFUSION: 0
DECREASED CONCENTRATION: 1
CONSTIPATION: 1
ABDOMINAL PAIN: 1
COLOR CHANGE: 0
DIFFICULTY URINATING: 0
DIARRHEA: 0

## 2017-03-18 ASSESSMENT — PAIN DESCRIPTION - DESCRIPTORS: DESCRIPTORS: SHARP

## 2017-03-18 NOTE — ED NOTES
Loli Samples from [facility] ER s/p nissen w dc 2 days ago with ? Ileus. To ER for reeval with CT fellow (discussed with Dr. Thorne staff)    Report from [facility] ER RN Raquel [phone] ([phone]):  Pt had hernia repair here on Monday. A g-tube was placed to relieve abdominal pressure. On Thursday pt had increased nausea and was unable to pass stool. Pt went to ER in [place] today had an abdominal CT. Illeus is suspected. Pt's g-tube is currently hooked up to low continuous suction. Pt has been VSS for ER staff and afebrile. 20g in left AC. Pt was given 1.5mg of dilauded, 4mg zofran, 10mg reglan. Pt has 1L NS bolus and has NS running at 150ml/hour. Pt is independent but complains of pain with ambulation so staff recommends SBA. Disk of CT imaging is coming with patient, staff will fax ER notes when MD is done with dictation. EMS is leaving now, they have orders from ER MD for Toradol and fentanyl for pain if needed. ETA 1230p.

## 2017-03-18 NOTE — IP AVS SNAPSHOT
Unit 6D Observation [address] [address]    Phone:  [phone]    Fax:  [phone]                                       After Visit Summary   3/18/2017    Loli Pearson    MRN: [id number]           After Visit Summary Signature Page     I have received my discharge instructions, and my questions have been answered. I have discussed any challenges I see with this plan with the nurse or doctor.    ..........................................................................................................................................  Patient/Patient Representative Signature      ..........................................................................................................................................  Patient Representative Print Name and Relationship to Patient    ..................................................               ................................................  Date                                            Time    ..........................................................................................................................................  Reviewed by Signature/Title    ...................................................              ..............................................  Date                                                            Time

## 2017-03-18 NOTE — ED NOTES
Attempted to call report to 6D, nurse unavailable and will have to call ER when they can take report.

## 2017-03-18 NOTE — ED PROVIDER NOTES
History     Chief Complaint   Patient presents with     Abdominal Pain     hernia repair surgery Monday     HPI  Loli Pearson is a 46 year old female with a history of large hiatal hernia status post reduction, Nissen fundoplication and G-tube placement on 3/13/2017 (~5 days ago). The patient was discharged on Thursday (2 days ago) and was feeling relatively well until early yesterday morning when she developed an intermittent, cramping, left-sided abdominal pain. Her pain has been gradually worsening since onset associated with constipation and distention. Her  called the clinic and she was prescribed a suppository. She tried this and did pass some gas but otherwise has continued to have obstipation. She was seen at the local hospital in [place] where she was diagnosed with adynamic ileus per abdominal CT and was thus transferred to the [place] [facility] for further evaluation.  Suction to her G-tube was applied in the ER throughout transfer course. Now in the Emergency Department, the patient rates her pain as 2/10 because she had pain medications prior to transport. No current nausea, chest pain, shortness of breath. She is on a full liquid diet and only uses the G-tube for venting. No other complaints at this time.      I have reviewed the Medications, Allergies, Past Medical and Surgical History, and Social History in the [employer] website system.  Past Medical History   Diagnosis Date     Gastroesophageal reflux disease      Other chronic pain      Paraesophageal hernia      Shortness of breath      due to hernia       Past Surgical History   Procedure Laterality Date     Hc esoph/gas reflux test w nasal imped >1 hr N/A 2/15/2017     Procedure: ESOPHAGEAL IMPEDENCE FUNCTION TEST WITH 24 HOUR PH GREATER THAN 1 HOUR;  Surgeon: Sumanth Doyle MD;  [facility]: [facility]     Gyn surgery       partial hysterectomy     Ent surgery       right ear surgery     Biopsy       fatty lipoma on back  removed     Orthopedic surgery       right rotator cuff surgery     Colonoscopy       Laparoscopic herniorrhaphy hiatal N/A 3/13/2017     Procedure: LAPAROSCOPIC HERNIORRHAPHY HIATAL;  Surgeon: Cheryl Schilling MD;  Location: [facility]     Esophagoscopy, gastroscopy, duodenoscopy (egd), combined N/A 3/13/2017     Procedure: COMBINED ESOPHAGOSCOPY, GASTROSCOPY, DUODENOSCOPY (EGD);  Surgeon: Cheryl Schilling MD;  Location: [facility] OR       No family history on file.    Social History   Substance Use Topics     Smoking status: Never Smoker     Smokeless tobacco: Not on file     Alcohol use No       Current Facility-Administered Medications   Medication     potassium chloride SA (K-DUR/KLOR-CON M) CR tablet 30 mEq     pink lady enema (COMPOUNDED: docusate, magnesium citrate,mineral oil,sodium phosphate)     docusate sodium (COLACE) capsule 100 mg     sennosides (SENOKOT) tablet 8.6 mg     pink lady enema (COMPOUNDED: docusate, magnesium citrate,mineral oil,sodium phosphate)     Current Outpatient Prescriptions   Medication     bisacodyl (DULCOLAX) 10 MG Suppository     sennosides (SENOKOT) 8.8 MG/5ML syrup     acetaminophen (TYLENOL) 160 MG/5ML solution     docusate (COLACE) 50 MG/5ML liquid     oxyCODONE (ROXICODONE) 5 MG/5ML solution     NORTRIPTYLINE HCL PO     HYDROCHLOROTHIAZIDE PO     PRAMIPEXOLE DIHYDROCHLORIDE PO        No Known Allergies   Review of Systems   Constitutional: Positive for appetite change. Negative for activity change and fever.   HENT: Negative for congestion and trouble swallowing.    Eyes: Negative for redness and visual disturbance.   Respiratory: Negative for chest tightness and shortness of breath.    Cardiovascular: Negative for chest pain (ad describe some previously but now resolved more likely from laparoscopic air).   Gastrointestinal: Positive for abdominal pain (left-sided), constipation and nausea. Negative for blood in stool, diarrhea and vomiting.   Genitourinary: Negative for difficulty  urinating and dysuria.   Musculoskeletal: Negative for arthralgias and neck stiffness.   Skin: Positive for wound (healing). Negative for color change.   Allergic/Immunologic: Negative for immunocompromised state.   Neurological: Positive for weakness (Gen.). Negative for syncope, light-headedness and headaches.   Psychiatric/Behavioral: Positive for decreased concentration (mild) and dysphoric mood. Negative for confusion.   All other systems reviewed and are negative.      Physical Exam      Physical Exam   Constitutional: She is oriented to person, place, and time. She appears well-developed and well-nourished. She appears distressed.   Patient here with  mild distress alert and oriented is nontoxic   HENT:   Head: Normocephalic and atraumatic.   Dry mucous membranes   Eyes: Conjunctivae and EOM are normal. Pupils are equal, round, and reactive to light. No scleral icterus.   Neck: Normal range of motion. Neck supple.   Cardiovascular: Regular rhythm.    Mild tachycardia   Pulmonary/Chest: No stridor. No respiratory distress.   Abdominal: She exhibits no distension. There is tenderness. There is no rebound and no guarding.   Patient's G-tube site appears normal without signs of infection or bleeding.  The incisions have Dermabond applied in all appear to be healing well without secondary signs of infection or marked tenderness.  The abdomen itself is not markedly distended mild tenderness left sided without rebound or guarding noted.   Musculoskeletal: She exhibits no edema, tenderness or deformity.   Neurological: She is alert and oriented to person, place, and time. She has normal reflexes. No cranial nerve deficit. Coordination normal.   Skin: Skin is warm and dry. No rash noted. She is not diaphoretic. No erythema. No pallor.   Psychiatric:   Mild flat affect.   Nursing note and vitals reviewed.      ED Course     ED Course     Procedures         Information from ER physician along with Dr. Schilling  regarding transfer patient ER for reassessment.  Patient records were sent.  Labs reviewed.  CT scan concerning for adynamic ileus.  Patient otherwise stable ER did consult thoracic surgery fellow who did see the patient ER recommendations will be admitted to their service observation overnight for treating adynamic ileus patient was stable in ER.    Critical Care time:  none               Labs Ordered and Resulted from Time of ED Arrival Up to the Time of Departure from the ED - No data to display    Assessments & Plan (with Medical Decision Making)  46-year-old female status post Nissen fundoplication 5 days ago discharged 2 days ago from the hospital now with abdominal cramping with nausea.  She has not vomited she events out of her G-tube.  Patient was in [place] ER today labs are done CT scan along with suction to the G-tube.  Findings reveal adynamic ileus discussed with thoracic surgery staff except the patient the ER here patient reassessed stable seen by thoracic surgery fellow will be admitted to their service observation treatment for adynamic ileus patient stable in ER.           I have reviewed the nursing notes.    I have reviewed the findings, diagnosis, plan and need for follow up with the patient.    New Prescriptions    No medications on file       Final diagnoses:   Adynamic ileus (H)   S/P Nissen fundoplication (with gastrostomy tube placement) (H)     IKirsten, am serving as a trained medical scribe to document services personally performed by Dioni Barillas MD, based on the provider's statements to me. This document has been checked and approved by the attending provider.    IDioni MD was physically present and have reviewed and verified the accuracy of this note documented by Kirsten Powers.       3/18/2017   [facility] EMERGENCY DEPARTMENT    This note was created at least in part by the use of dragon voice dictation system. Inadvertent typographical errors may still  exist.  Dioni Barillas MD.         Dioni Barillas MD  03/18/17 2875

## 2017-03-18 NOTE — IP AVS SNAPSHOT
MRN:[id number]                      After Visit Summary   3/18/2017    Loli Pearson    MRN: [id number]           Thank you!     Thank you for choosing [facility] for your care. Our goal is always to provide you with excellent care. Hearing back from our patients is one way we can continue to improve our services. Please take a few minutes to complete the written survey that you may receive in the mail after you visit with us. Thank you!        Patient Information     Date Of Birth          1970        About your hospital stay     You were admitted on:  March 18, 2017 You last received care in the:  Unit 6D Observation [facility]    You were discharged on:  March 20, 2017        Reason for your hospital stay       You had an ileus (bowel not moving after surgery).                  Who to Call     For medical emergencies, please call 911.  For non-urgent questions about your medical care, please call your primary care provider or clinic, [phone]          Attending Provider     Provider Specialty    Dioni Barillas MD Emergency Medicine    Cheryl Schilling MD Thoracic Diseases       Primary Care Provider Office Phone # Fax #    Noemy FredymiALEKSANDAR dai [phone]       [facility] [address] [facility] [address]        After Care Instructions     Activity       See discharge instructions.            Diet       See discharge instructions.            Discharge Instructions       THORACIC SURGERY DISCHARGE INSTRUCTIONS    BOWELS  Take your stool softeners as prescribed; you may reduce the amount you take if you have loose stools, but try to take a stool softener daily while taking narcotics. You may also take over the counter fiber (metamucil or benefiber) to help you have more formed and frequent bowel movements.     Stay hydrated.  Walk frequently. This will help your bowel move.      DIET  Nissen diet as instructed previously by the nutritionist - full  liquids at the time of discharge      ACTIVITY  If your plans upon discharge include prolonged periods of sitting (i.e a lengthy car or plane ride), it is highly beneficial to get up and walk at least once per hour to help prevent swelling and blood clots.     Activity as tolerated, no strenous activity until seen in follow-up, no lifting greater than 10 pounds for 8-10 weeks after your initial surgery.      OTHER INSTRUCTIONS  Take incentive spirometer home for continued frequent use    No driving while taking narcotic pain medication.    Transition to ibuprofen or tylenol/acetaminophen for pain control. Do not take tylenol/acetaminophen and acetaminophen containing narcotic (e.g., percocet or vicodin) at the same time. If you have known ulcer problems, or kidney trouble (elevated creatinine) do not take the ibuprofen.      WHEN TO CALL    Call the numbers below for fever greater than 101.5, chills, increased size of incision, red skin around incision, vision changes, muscle strength changes, sensation changes, shortness of breath, or other concerns.    In emergencies, call [phone]    For other Questions or Concerns;   A.) During weekday working hours (Monday through Friday 8am to 4:30pm)   call [phone] and ask to speak to a clinical nurse specialist.     B.) At nights (after 4:30pm), on weekends, or if urgent call [phone] and   tell the   I would like to page job code [phone], the thoracic surgery   fellow on call, please.       PEG TUBE INSTRUCTIONS:    Venting:  -You should vent or temporarily put your tube to gravity bag (or basin) drainage if you experience nausea or bloating. This is important as it will help to protect the hernia repair. If you are uncertain how to do this, please ask your nurse for instruction.    Skin care:  -Change the gauze dressing around your PEG tube daily.  -Check for redness and swelling in the area where the tube goes into your skin.   -Keep the skin around your  tube dry and with a split gauze under the flange. If the hole where the tube enters your body is draining fluid, you may need to put barrier cream or antibiotic cream on the skin around your tube after you are done cleaning it. Cover the area with bandages, and call your caregiver.   -If there are no stitches holding your PEG tube in place on your skin, gently turn the tube. This may decrease pressure on your skin, and help prevent an infection. Ask your caregiver if you should turn your PEG tube, and how to do it correctly.     Flushes:  -Flush your feeding tube with 30cc of water twice daily to ensure it does not become plugged  -If administering medications or tube feedings via your tube, make sure to flush with water immediately after use to prevent the tube from plugging                  Follow-up Appointments     Adult [facility] Follow-up and recommended labs and tests       1.) Follow up with Noemy Rizo in 1-2 weeks to review how you are doing after surgery.  2.) Follow up with Cheryl Schilling MD will be arranged in 3 weeks. You will have an upper GI (special x-ray of your esophagus/stomach) prior to this appointment. The Thoracic office will contact you to set up the appointment.  You may call [phone] if you wish to schedule before you are contacted. Please call if you have not heard regarding this appointment within 3 business days of discharge.                  Pending Results     No orders found from 3/16/2017 to 3/19/2017.            Statement of Approval     Ordered          03/20/17 0908  I have reviewed and agree with all the recommendations and orders detailed in this document.  EFFECTIVE NOW     Approved and electronically signed by:  Saloni Saldivar MD             Admission Information     Date & Time Provider Department Dept. Phone    3/18/2017 Cheryl Schilling MD Unit 6D Observation [facility] [phone]      Your Vitals Were     Blood Pressure Pulse Temperature Respirations  "Height Weight    114/74 (BP Location: Right arm) 89 98.1  F (36.7  C) (Oral) 16 1.753 m (5' 9\") 90.7 kg (200 lb)    Pulse Oximetry BMI (Body Mass Index)                93% 29.53 kg/m2          [employer] Information     [employer] gives you secure access to your electronic health record. If you see a primary care provider, you can also send messages to your care team and make appointments. If you have questions, please call your primary care clinic.  If you do not have a primary care provider, please call [phone] and they will assist you.        Care EveryWhere ID     This is your Care EveryWhere ID. This could be used by other organizations to access your [facility] medical records  [id number]           Review of your medicines      START taking        Dose / Directions    ondansetron 4 MG ODT tab   Commonly known as:  ZOFRAN-ODT   Used for:  Adynamic ileus (H)        Dose:  4 mg   Take 1 tablet (4 mg) by mouth every 6 hours as needed for nausea   Quantity:  60 tablet   Refills:  1       polyethylene glycol powder   Commonly known as:  MIRALAX   Used for:  Adynamic ileus (H)        Dose:  1 capful   Take 17 g (1 capful) by mouth daily   Quantity:  500 g   Refills:  1       sennosides 8.6 MG tablet   Commonly known as:  SENOKOT   Used for:  Adynamic ileus (H)   Replaces:  sennosides 8.8 MG/5ML syrup        Dose:  1-2 tablet   Take 1-2 tablets by mouth 2 times daily Crush pill.   Quantity:  120 each   Refills:  1         CONTINUE these medicines which have NOT CHANGED        Dose / Directions    acetaminophen 160 MG/5ML solution   Commonly known as:  TYLENOL   Used for:  Acute post-operative pain        Dose:  975 mg   Take 30.45 mLs (975 mg) by mouth every 8 hours   Quantity:  500 mL   Refills:  0       bisacodyl 10 MG Suppository   Commonly known as:  DULCOLAX   Used for:  Constipation due to pain medication therapy        Dose:  10 mg   Place 1 suppository (10 mg) rectally daily as needed for constipation "   Quantity:  3 suppository   Refills:  0       docusate 50 MG/5ML liquid   Commonly known as:  COLACE   Used for:  Bowel habit changes        Dose:  100 mg   Take 10 mLs (100 mg) by mouth 2 times daily   Quantity:  300 mL   Refills:  0       HYDROCHLOROTHIAZIDE PO   Indication:  take half tablet daily        Dose:  12.5 mg   Take 12.5 mg by mouth daily   Refills:  0       NORTRIPTYLINE HCL PO        Dose:  25 mg   Take 25 mg by mouth At Bedtime   Refills:  0       oxyCODONE 5 MG/5ML solution   Commonly known as:  ROXICODONE   Used for:  Acute post-operative pain        Dose:  5-10 mg   Take 5-10 mLs (5-10 mg) by mouth every 4 hours as needed for moderate to severe pain   Quantity:  350 mL   Refills:  0       PRAMIPEXOLE DIHYDROCHLORIDE PO        Dose:  0.25 mg   Take 0.25 mg by mouth daily   Refills:  0         STOP taking     sennosides 8.8 MG/5ML syrup   Commonly known as:  SENOKOT   Replaced by:  sennosides 8.6 MG tablet                Where to get your medicines      These medications were sent to [facility] [address] [address]     Phone:  [phone]     ondansetron 4 MG ODT tab    polyethylene glycol powder    sennosides 8.6 MG tablet                Protect others around you: Learn how to safely use, store and throw away your medicines at www.disposemymeds.org.             Medication List: This is a list of all your medications and when to take them. Check marks below indicate your daily home schedule. Keep this list as a reference.      Medications           Morning Afternoon Evening Bedtime As Needed    acetaminophen 160 MG/5ML solution   Commonly known as:  TYLENOL   Take 30.45 mLs (975 mg) by mouth every 8 hours   Last time this was given:  650 mg on 3/20/2017  8:24 AM                                bisacodyl 10 MG Suppository   Commonly known as:  DULCOLAX   Place 1 suppository (10 mg) rectally daily as needed for  constipation                                docusate 50 MG/5ML liquid   Commonly known as:  COLACE   Take 10 mLs (100 mg) by mouth 2 times daily   Last time this was given:  100 mg on 3/20/2017  8:18 AM                                HYDROCHLOROTHIAZIDE PO   Take 12.5 mg by mouth daily   Last time this was given:  12.5 mg on 3/20/2017  8:17 AM                                NORTRIPTYLINE HCL PO   Take 25 mg by mouth At Bedtime   Last time this was given:  25 mg on 3/19/2017  9:18 PM                                ondansetron 4 MG ODT tab   Commonly known as:  ZOFRAN-ODT   Take 1 tablet (4 mg) by mouth every 6 hours as needed for nausea   Last time this was given:  4 mg on 3/19/2017 11:04 AM                                oxyCODONE 5 MG/5ML solution   Commonly known as:  ROXICODONE   Take 5-10 mLs (5-10 mg) by mouth every 4 hours as needed for moderate to severe pain                                polyethylene glycol powder   Commonly known as:  MIRALAX   Take 17 g (1 capful) by mouth daily                                PRAMIPEXOLE DIHYDROCHLORIDE PO   Take 0.25 mg by mouth daily   Last time this was given:  0.25 mg on 3/19/2017  9:18 PM                                sennosides 8.6 MG tablet   Commonly known as:  SENOKOT   Take 1-2 tablets by mouth 2 times daily Crush pill.

## 2017-03-18 NOTE — H&P
Thoracic Surgery    Patient had a lap repair of paraesophageal hernia and Nissen fundoplication on 3/13/17. She was moving her bowels prior to discharge. She returns with complaints that she has not had a bowel movement since discharge and has developed severe abdominal pain. Pain is primarily left side. + distention. No n/v, f/c. Tolerating po. She has not been regularly venting her gastrostomy tube.     NAD  Unlabored respirations.   Mild tachycardia  Abdomen soft, minimal distention, incisions c/d/i, g tube clamped, no guarding no rebound tenderness  No edema  Afebrile, normal WBC    A/P 46F with ileus s/p lap repair of paraesophageal hernia, Nissen fundoplcation, and G tube placement.   Admit to observation  Clear liquids  G tube to gravity  Electrolyte repletion  Ambulation  Pain control  Enemas  IV hydration  Repeat labs in am

## 2017-03-19 LAB
ANION GAP SERPL CALCULATED.3IONS-SCNC: 10 MMOL/L (ref 3–14)
BUN SERPL-MCNC: 6 MG/DL (ref 7–30)
CALCIUM SERPL-MCNC: 7.8 MG/DL (ref 8.5–10.1)
CHLORIDE SERPL-SCNC: 108 MMOL/L (ref 94–109)
CO2 SERPL-SCNC: 24 MMOL/L (ref 20–32)
CREAT SERPL-MCNC: 0.65 MG/DL (ref 0.52–1.04)
GFR SERPL CREATININE-BSD FRML MDRD: ABNORMAL ML/MIN/1.7M2
GLUCOSE SERPL-MCNC: 89 MG/DL (ref 70–99)
MAGNESIUM SERPL-MCNC: 2 MG/DL (ref 1.6–2.3)
PHOSPHATE SERPL-MCNC: 3 MG/DL (ref 2.5–4.5)
POTASSIUM SERPL-SCNC: 3.6 MMOL/L (ref 3.4–5.3)
POTASSIUM SERPL-SCNC: 4 MMOL/L (ref 3.4–5.3)
SODIUM SERPL-SCNC: 142 MMOL/L (ref 133–144)

## 2017-03-19 PROCEDURE — A9270 NON-COVERED ITEM OR SERVICE: HCPCS | Mod: GY | Performed by: FAMILY MEDICINE

## 2017-03-19 PROCEDURE — 83735 ASSAY OF MAGNESIUM: CPT | Performed by: THORACIC SURGERY (CARDIOTHORACIC VASCULAR SURGERY)

## 2017-03-19 PROCEDURE — 25000128 H RX IP 250 OP 636: Performed by: THORACIC SURGERY (CARDIOTHORACIC VASCULAR SURGERY)

## 2017-03-19 PROCEDURE — 84100 ASSAY OF PHOSPHORUS: CPT | Performed by: THORACIC SURGERY (CARDIOTHORACIC VASCULAR SURGERY)

## 2017-03-19 PROCEDURE — 25000132 ZZH RX MED GY IP 250 OP 250 PS 637: Mod: GY | Performed by: SURGERY

## 2017-03-19 PROCEDURE — A9270 NON-COVERED ITEM OR SERVICE: HCPCS | Mod: GY | Performed by: STUDENT IN AN ORGANIZED HEALTH CARE EDUCATION/TRAINING PROGRAM

## 2017-03-19 PROCEDURE — A9270 NON-COVERED ITEM OR SERVICE: HCPCS | Mod: GY | Performed by: THORACIC SURGERY (CARDIOTHORACIC VASCULAR SURGERY)

## 2017-03-19 PROCEDURE — 25800025 ZZH RX 258: Performed by: THORACIC SURGERY (CARDIOTHORACIC VASCULAR SURGERY)

## 2017-03-19 PROCEDURE — 25000132 ZZH RX MED GY IP 250 OP 250 PS 637: Mod: GY | Performed by: FAMILY MEDICINE

## 2017-03-19 PROCEDURE — 36415 COLL VENOUS BLD VENIPUNCTURE: CPT | Performed by: SURGERY

## 2017-03-19 PROCEDURE — A9270 NON-COVERED ITEM OR SERVICE: HCPCS | Mod: GY | Performed by: SURGERY

## 2017-03-19 PROCEDURE — 96361 HYDRATE IV INFUSION ADD-ON: CPT

## 2017-03-19 PROCEDURE — 84132 ASSAY OF SERUM POTASSIUM: CPT | Performed by: SURGERY

## 2017-03-19 PROCEDURE — 80048 BASIC METABOLIC PNL TOTAL CA: CPT | Performed by: THORACIC SURGERY (CARDIOTHORACIC VASCULAR SURGERY)

## 2017-03-19 PROCEDURE — G0378 HOSPITAL OBSERVATION PER HR: HCPCS

## 2017-03-19 PROCEDURE — 25000132 ZZH RX MED GY IP 250 OP 250 PS 637: Mod: GY | Performed by: STUDENT IN AN ORGANIZED HEALTH CARE EDUCATION/TRAINING PROGRAM

## 2017-03-19 PROCEDURE — 36415 COLL VENOUS BLD VENIPUNCTURE: CPT | Performed by: THORACIC SURGERY (CARDIOTHORACIC VASCULAR SURGERY)

## 2017-03-19 PROCEDURE — 25000132 ZZH RX MED GY IP 250 OP 250 PS 637: Mod: GY | Performed by: THORACIC SURGERY (CARDIOTHORACIC VASCULAR SURGERY)

## 2017-03-19 PROCEDURE — 25000125 ZZHC RX 250: Performed by: THORACIC SURGERY (CARDIOTHORACIC VASCULAR SURGERY)

## 2017-03-19 PROCEDURE — 96372 THER/PROPH/DIAG INJ SC/IM: CPT

## 2017-03-19 RX ORDER — SENNOSIDES 8.6 MG
8.6 TABLET ORAL 2 TIMES DAILY PRN
Status: DISCONTINUED | OUTPATIENT
Start: 2017-03-19 | End: 2017-03-20 | Stop reason: HOSPADM

## 2017-03-19 RX ORDER — KETOROLAC TROMETHAMINE 30 MG/ML
30 INJECTION, SOLUTION INTRAMUSCULAR; INTRAVENOUS EVERY 6 HOURS PRN
Status: DISCONTINUED | OUTPATIENT
Start: 2017-03-19 | End: 2017-03-20 | Stop reason: HOSPADM

## 2017-03-19 RX ORDER — HYDROXYZINE HCL 10 MG/5 ML
25-50 SOLUTION, ORAL ORAL EVERY 6 HOURS PRN
Status: DISCONTINUED | OUTPATIENT
Start: 2017-03-19 | End: 2017-03-20 | Stop reason: HOSPADM

## 2017-03-19 RX ORDER — POLYETHYLENE GLYCOL 3350 17 G/17G
17 POWDER, FOR SOLUTION ORAL DAILY
Status: DISCONTINUED | OUTPATIENT
Start: 2017-03-19 | End: 2017-03-20 | Stop reason: HOSPADM

## 2017-03-19 RX ORDER — DOCUSATE SODIUM 100 MG/1
100 CAPSULE, LIQUID FILLED ORAL 2 TIMES DAILY
Status: DISCONTINUED | OUTPATIENT
Start: 2017-03-19 | End: 2017-03-19

## 2017-03-19 RX ORDER — POTASSIUM CHLORIDE 20MEQ/15ML
40 LIQUID (ML) ORAL ONCE
Status: COMPLETED | OUTPATIENT
Start: 2017-03-19 | End: 2017-03-19

## 2017-03-19 RX ADMIN — POLYETHYLENE GLYCOL 3350 17 G: 17 POWDER, FOR SOLUTION ORAL at 10:42

## 2017-03-19 RX ADMIN — DOCUSATE SODIUM 100 MG: 50 LIQUID ORAL at 10:45

## 2017-03-19 RX ADMIN — HYDROCHLOROTHIAZIDE 12.5 MG: 12.5 CAPSULE ORAL at 10:42

## 2017-03-19 RX ADMIN — ACETAMINOPHEN 650 MG: 160 SUSPENSION ORAL at 07:12

## 2017-03-19 RX ADMIN — ACETAMINOPHEN 650 MG: 160 SUSPENSION ORAL at 01:35

## 2017-03-19 RX ADMIN — ACETAMINOPHEN 650 MG: 160 SUSPENSION ORAL at 21:17

## 2017-03-19 RX ADMIN — ONDANSETRON 4 MG: 4 TABLET, ORALLY DISINTEGRATING ORAL at 11:04

## 2017-03-19 RX ADMIN — NORTRIPTYLINE HYDROCHLORIDE 25 MG: 25 CAPSULE ORAL at 21:18

## 2017-03-19 RX ADMIN — SODIUM CHLORIDE, POTASSIUM CHLORIDE, SODIUM LACTATE AND CALCIUM CHLORIDE: 600; 310; 30; 20 INJECTION, SOLUTION INTRAVENOUS at 05:35

## 2017-03-19 RX ADMIN — ACETAMINOPHEN 650 MG: 160 SUSPENSION ORAL at 15:03

## 2017-03-19 RX ADMIN — DOCUSATE SODIUM 100 MG: 50 LIQUID ORAL at 21:17

## 2017-03-19 RX ADMIN — SENNOSIDES A AND B 5 ML: 415.36 LIQUID ORAL at 10:40

## 2017-03-19 RX ADMIN — PRAMIPEXOLE DIHYDROCHLORIDE 0.25 MG: 0.25 TABLET ORAL at 21:18

## 2017-03-19 RX ADMIN — ENOXAPARIN SODIUM 40 MG: 40 INJECTION SUBCUTANEOUS at 10:46

## 2017-03-19 RX ADMIN — Medication 40 MEQ: at 10:39

## 2017-03-19 ASSESSMENT — PAIN DESCRIPTION - DESCRIPTORS
DESCRIPTORS: OTHER (COMMENT)
DESCRIPTORS: SORE
DESCRIPTORS: ACHING

## 2017-03-19 NOTE — PLAN OF CARE
Problem: Discharge Planning  Goal: Discharge Planning (Adult, OB, Behavioral, Peds)  Outpatient/Observation goals to be met before discharge home:  -Return of bowel function with improvement in her symptoms of abdominal pain and distention. Frequent moderate Liquid BM output. Relief of abd pain with PRN pain meds  -Must also tolerate PO intake prior to discharge: Yes, tolerating clear liquid diet. Denies n/v    Pt A&Ox4 , AVSS. Gtube attached to [place] bag, Gravity drainage, 250cc output. SBA with ambulation. Voiding independently. Will continue plan of care

## 2017-03-19 NOTE — PROGRESS NOTES
THORACIC SURGERY PROGRESS NOTE  03/19/2017    Patient: Loli Pearson  MRN: [id number]    Subjective  No acute overnight events. Feels 100% better; had a BM yesterday after an enema, tolerating clears without nausea. Ambulating. Pain ok.       Objective  Temp:  [97.9  F (36.6  C)-98.5  F (36.9  C)] 97.9  F (36.6  C)  Pulse:  [102] 102  Heart Rate:  [] 89  Resp:  [15-18] 16  BP: (103-120)/(67-86) 111/73  SpO2:  [93 %-99 %] 95 %    General: AAOx4, NAD, lying comfortably in bed  CV: regular rate, warm, well-perfused  Pulm: no dyspnea, breathing comfortably on RA  Abd: soft, minimally distended, G tube to gravity, no rebound or guarding  Extremities: no edema  Neuro: moving all extremities spontaneously without apparent deficit    I/O last 3 completed shifts:  In: 720 [P.O.:720]  Out: 250 [Emesis/NG output:250]   Urine 1x  Stool 2x    Labs:  BMP this am pending      Assessment & Plan  Loli Pearson is a 46 year old female who presented on 3/18/2017 with ileus s/p lap repair of paraesophageal hernia, Nissen fundoplcation, and G tube placement on 3/13/2017.    Neuro:      - Pain: oxycodone prn, morphine prn, Tylenol prn     - Nortriptyline, Mirapex  CV: HDS, no issues, home HCTZ  Pulm: satting on RA, encourage IS  FEN/GI:      - G tube to gravity, will likely clamp in the afternoon     - IVF: d/c IVFs     - Diet: advance to fulls (on fulls prior to admission)     - Bowel regimen: colace liquid, senna liquid, add Miralax today, no further enemas     - replete lytes prn, BMP this am pending     - Nausea control: Zofran prn  Renal: UOPA, voiding spontaneously  PPx: OOB, IS, SCDs, Lovenox  Dispo: 6D observation, discharge to home likely later today vs tomorrow pending how patient tolerates po      Patient seen and discussed with fellow and will be discussed with staff.  - - - - - - - - - - - - - - - - - -   Saloni Saldivar MD  General Surgery PGY-1

## 2017-03-19 NOTE — PLAN OF CARE
Problem: Discharge Planning  Goal: Discharge Planning (Adult, OB, Behavioral, Peds)  Outpatient/Observation goals to be met before discharge home:     List all goals to be met before discharge home:     1. Return of bowel function with improvement in her symptoms of abdominal pain and distention. - NO - pain to right abdomen, pink lady enema being given, will continue to monitor.   2. She must also tolerate po intake prior to discharge. - NO - offered fluids, will continue to monitor.

## 2017-03-19 NOTE — PROGRESS NOTES
Pt called for nurse d/t onset of severe nausea. Pt's nurse unavailable, this nurse checked on pt. Pt stated she had recently taken PO liquid stool softener that caused a burning sensation in the back of her throat and shortly after nausea began. VSS with SaO2 98%  /89 RR 16. Pt denies throat tightness, difficulty breathing, or SOB. Pt's RN notified and zofran ODT given with relief.

## 2017-03-19 NOTE — PLAN OF CARE
Problem: Discharge Planning  Goal: Discharge Planning (Adult, OB, Behavioral, Peds)  Outpatient/Observation goals to be met before discharge home:  -Return of bowel function with improvement in her symptoms of abdominal pain and distention. Moderate Liquid BM output. Relief of abd pain with PRN pain meds  -Must also tolerate PO intake prior to discharge: YES - tolerating PO clear liquid diet and po meds , denies n/v

## 2017-03-19 NOTE — PLAN OF CARE
"Problem: Discharge Planning  Goal: Discharge Planning (Adult, OB, Behavioral, Peds)  Outpatient/Observation goals to be met before discharge home:  -Return of bowel function with improvement in her symptoms of abdominal pain and distention. PENDING - pt has slight distension but states \"much better than before\"; no BM yet this shift.   -Must also tolerate PO intake prior to discharge: YES - tolerating PO clear liquid diet and po meds , denies n/v  "

## 2017-03-19 NOTE — PLAN OF CARE
"Problem: Discharge Planning  Goal: Discharge Planning (Adult, OB, Behavioral, Peds)  Outpatient/Observation goals to be met before discharge home:  -Return of bowel function with improvement in her symptoms of abdominal pain and distention. PENDING - pt has slight distension but states \"much better than before\"; 3-4 loose stools reported overnight. Pt states loose BM early afternoon. No BM since 2:45 pm.   -Must also tolerate PO intake prior to discharge: NO - Pt reported nausea after am po medicaitons, G-Tube open to gravity, but is clamped at this time. Zofran given with relief. Pt tolerating PO clear liquid diet, advanced to Full Liquid prior to lunch and tolerated well.     Patient has been out ambulating hallways with .   "

## 2017-03-19 NOTE — PLAN OF CARE
Problem: Discharge Planning  Goal: Discharge Planning (Adult, OB, Behavioral, Peds)  Outpatient/Observation goals to be met before discharge home:  -Return of bowel function with improvement in her symptoms of abdominal pain and distention. Moderate Liquid BM output. Relief of abd pain with PRN pain meds  -Must also tolerate PO intake prior to discharge: Yes, tolerating PO , denies n/v

## 2017-03-20 VITALS
RESPIRATION RATE: 16 BRPM | DIASTOLIC BLOOD PRESSURE: 74 MMHG | TEMPERATURE: 98.1 F | WEIGHT: 200 LBS | OXYGEN SATURATION: 93 % | SYSTOLIC BLOOD PRESSURE: 114 MMHG | BODY MASS INDEX: 29.62 KG/M2 | HEIGHT: 69 IN | HEART RATE: 89 BPM

## 2017-03-20 PROCEDURE — G0378 HOSPITAL OBSERVATION PER HR: HCPCS

## 2017-03-20 PROCEDURE — 25000132 ZZH RX MED GY IP 250 OP 250 PS 637: Mod: GY | Performed by: SURGERY

## 2017-03-20 PROCEDURE — 25000132 ZZH RX MED GY IP 250 OP 250 PS 637: Mod: GY | Performed by: THORACIC SURGERY (CARDIOTHORACIC VASCULAR SURGERY)

## 2017-03-20 PROCEDURE — A9270 NON-COVERED ITEM OR SERVICE: HCPCS | Mod: GY | Performed by: STUDENT IN AN ORGANIZED HEALTH CARE EDUCATION/TRAINING PROGRAM

## 2017-03-20 PROCEDURE — 25000132 ZZH RX MED GY IP 250 OP 250 PS 637: Mod: GY | Performed by: STUDENT IN AN ORGANIZED HEALTH CARE EDUCATION/TRAINING PROGRAM

## 2017-03-20 PROCEDURE — A9270 NON-COVERED ITEM OR SERVICE: HCPCS | Mod: GY | Performed by: THORACIC SURGERY (CARDIOTHORACIC VASCULAR SURGERY)

## 2017-03-20 PROCEDURE — A9270 NON-COVERED ITEM OR SERVICE: HCPCS | Mod: GY | Performed by: SURGERY

## 2017-03-20 PROCEDURE — 25000128 H RX IP 250 OP 636: Performed by: THORACIC SURGERY (CARDIOTHORACIC VASCULAR SURGERY)

## 2017-03-20 PROCEDURE — 96372 THER/PROPH/DIAG INJ SC/IM: CPT

## 2017-03-20 RX ORDER — ONDANSETRON 4 MG/1
4 TABLET, ORALLY DISINTEGRATING ORAL EVERY 6 HOURS PRN
Qty: 60 TABLET | Refills: 1 | Status: SHIPPED | OUTPATIENT
Start: 2017-03-20 | End: 2017-03-31

## 2017-03-20 RX ORDER — POLYETHYLENE GLYCOL 3350 17 G/17G
1 POWDER, FOR SOLUTION ORAL DAILY
Qty: 500 G | Refills: 1 | Status: SHIPPED | OUTPATIENT
Start: 2017-03-20 | End: 2018-09-06

## 2017-03-20 RX ORDER — SENNOSIDES 8.6 MG
1-2 TABLET ORAL 2 TIMES DAILY
Qty: 120 EACH | Refills: 1 | Status: SHIPPED | OUTPATIENT
Start: 2017-03-20

## 2017-03-20 RX ADMIN — DOCUSATE SODIUM 100 MG: 50 LIQUID ORAL at 08:18

## 2017-03-20 RX ADMIN — ACETAMINOPHEN 650 MG: 160 SUSPENSION ORAL at 08:24

## 2017-03-20 RX ADMIN — ENOXAPARIN SODIUM 40 MG: 40 INJECTION SUBCUTANEOUS at 08:17

## 2017-03-20 RX ADMIN — HYDROCHLOROTHIAZIDE 12.5 MG: 12.5 CAPSULE ORAL at 08:17

## 2017-03-20 RX ADMIN — POLYETHYLENE GLYCOL 3350 17 G: 17 POWDER, FOR SOLUTION ORAL at 08:18

## 2017-03-20 NOTE — PLAN OF CARE
Problem: Discharge Planning  Goal: Discharge Planning (Adult, OB, Behavioral, Peds)  Outpatient/Observation goals to be met before discharge home:     Return of bowel function with improvement in her symptoms of abdominal pain and distention - Loose stools tonight, somewhat extended abdomen.  Tolerate po intake prior to discharge - YES

## 2017-03-20 NOTE — PLAN OF CARE
Problem: Discharge Planning  Goal: Discharge Planning (Adult, OB, Behavioral, Peds)  Outpatient/Observation goals to be met before discharge home:     1) Return of bowel function with improvement in her symptoms of abdominal pain and distention: YES   2) She must also tolerate po intake prior to discharge:YES

## 2017-03-20 NOTE — PROGRESS NOTES
Patient and  at bedside, reviewed d/c instructions, medications, follow ups, and gtube venting care. IV removed with no issues. Patient left unit by wheelchair.  to drive home.

## 2017-03-20 NOTE — DISCHARGE SUMMARY
THORACIC SURGERY DISCHARGE SUMMARY    NAME: Loli Pearson   MRN: [id number]   : 1970     DATE OF ADMISSION: 3/18/2017   DATE OF DISCHARGE: 3/20/2017    PRE/POSTOPERATIVE DIAGNOSES: Postoperative ileus    PROCEDURES PERFORMED: None    DRAINS/TUBES PRESENT AT DISCHARGE: PEG tube from initial operation    BRIEF HPI:   Ms. Pearson is a patient who had a lap repair of paraesophageal hernia and Nissen fundoplication on 3/13/2017. She was moving her bowels prior to discharge. She returned with complaints that she has not had a bowel movement since discharge and has developed severe abdominal pain. Pain is primarily left sided. + distention. No n/v, f/c. Tolerating po. She had not been regularly venting her gastrostomy tube.     HOSPITAL COURSE: She was made NPO, given an aggressive bowel regimen and enemas with good results and improvement in her abdominal distention and pain. Her diet was slowly advanced to full liquids, which she tolerated well. Her G tube was clamped, which she tolerated well with just occasional venting (1-2 times daily). She underwent further teaching regarding venting her G tube as needed. Her senna was switched from liquid, which she had avoided taking because of foul taste, to a crushable tablet, which she tolerated better. Prior to discharge, her pain was controlled well, she was able to perform ADLs and ambulate independently without difficulty, and had full return of bowel and bladder function.  On 3/20/2017, she was discharged to home in stable condition with PEG tube in place.    DAY OF DISCHARGE PHYSICAL EXAM:  Temp:  [97.9  F (36.6  C)-98.2  F (36.8  C)] 98.1  F (36.7  C)  Pulse:  [85-93] 89  Heart Rate:  [85] 85  Resp:  [16] 16  BP: (114-118)/(74-89) 114/74  SpO2:  [93 %-95 %] 93 %     General: AAOx4, NAD, lying comfortably in bed  CV: regular rate, warm, well-perfused  Pulm: no dyspnea, breathing comfortably on RA  Abd: soft, non-distended, non-tender, G tube in place  clamped  Extremities: no edema  Neuro: moving all extremities spontaneously without apparent deficit    DISCHARGE INSTRUCTIONS:    Discharge Procedure Orders  Reason for your hospital stay   Order Comments: You had an ileus (bowel not moving after surgery).     Adult [facility] Follow-up and recommended labs and tests   Order Comments: 1.) Follow up with Noemy Rizo in 1-2 weeks to review how you are doing after surgery.  2.) Follow up with Cheryl Schilling MD will be arranged in 3 weeks. You will have an upper GI (special x-ray of your esophagus/stomach) prior to this appointment. The Thoracic office will contact you to set up the appointment.  You may call [phone] if you wish to schedule before you are contacted. Please call if you have not heard regarding this appointment within 3 business days of discharge.     Activity   Order Comments: See discharge instructions.   Order Specific Question Answer Comments   Is discharge order? Yes      Discharge Instructions   Order Comments: THORACIC SURGERY DISCHARGE INSTRUCTIONS    BOWELS  Take your stool softeners as prescribed; you may reduce the amount you take if you have loose stools, but try to take a stool softener daily while taking narcotics. You may also take over the counter fiber (metamucil or benefiber) to help you have more formed and frequent bowel movements.     Stay hydrated.  Walk frequently. This will help your bowel move.      DIET  Nissen diet as instructed previously by the nutritionist - full liquids at the time of discharge      ACTIVITY  If your plans upon discharge include prolonged periods of sitting (i.e a lengthy car or plane ride), it is highly beneficial to get up and walk at least once per hour to help prevent swelling and blood clots.     Activity as tolerated, no strenous activity until seen in follow-up, no lifting greater than 10 pounds for 8-10 weeks after your initial surgery.      OTHER INSTRUCTIONS  Take incentive spirometer  home for continued frequent use    No driving while taking narcotic pain medication.    Transition to ibuprofen or tylenol/acetaminophen for pain control. Do not take tylenol/acetaminophen and acetaminophen containing narcotic (e.g., percocet or vicodin) at the same time. If you have known ulcer problems, or kidney trouble (elevated creatinine) do not take the ibuprofen.      WHEN TO CALL    Call the numbers below for fever greater than 101.5, chills, increased size of incision, red skin around incision, vision changes, muscle strength changes, sensation changes, shortness of breath, or other concerns.    In emergencies, call 911    For other Questions or Concerns;  A.) During weekday working hours (Monday through Friday 8am to 4:30pm)  call [phone] and ask to speak to a clinical nurse specialist.    B.) At nights (after 4:30pm), on weekends, or if urgent call [phone] and  tell the   I would like to page job code [phone], the thoracic surgery  fellow on call, please.       PEG TUBE INSTRUCTIONS:    Venting:  -You should vent or temporarily put your tube to gravity bag (or basin) drainage if you experience nausea or bloating. This is important as it will help to protect the hernia repair. If you are uncertain how to do this, please ask your nurse for instruction.    Skin care:  -Change the gauze dressing around your PEG tube daily.  -Check for redness and swelling in the area where the tube goes into your skin.   -Keep the skin around your tube dry and with a split gauze under the flange. If the hole where the tube enters your body is draining fluid, you may need to put barrier cream or antibiotic cream on the skin around your tube after you are done cleaning it. Cover the area with bandages, and call your caregiver.   -If there are no stitches holding your PEG tube in place on your skin, gently turn the tube. This may decrease pressure on your skin, and help prevent an infection. Ask your  caregiver if you should turn your PEG tube, and how to do it correctly.     Flushes:  -Flush your feeding tube with 30cc of water twice daily to ensure it does not become plugged  -If administering medications or tube feedings via your tube, make sure to flush with water immediately after use to prevent the tube from plugging     Diet   Order Comments: See discharge instructions.   Order Specific Question Answer Comments   Is discharge order? Yes          DISCHARGE MEDICATIONS:   Current Discharge Medication List      START taking these medications    Details   ondansetron (ZOFRAN-ODT) 4 MG ODT tab Take 1 tablet (4 mg) by mouth every 6 hours as needed for nausea  Qty: 60 tablet, Refills: 1    Associated Diagnoses: Adynamic ileus (H)      sennosides (SENOKOT) 8.6 MG tablet Take 1-2 tablets by mouth 2 times daily Crush pill.  Qty: 120 each, Refills: 1    Associated Diagnoses: Adynamic ileus (H)      polyethylene glycol (MIRALAX) powder Take 17 g (1 capful) by mouth daily  Qty: 500 g, Refills: 1    Associated Diagnoses: Adynamic ileus (H)         CONTINUE these medications which have NOT CHANGED    Details   bisacodyl (DULCOLAX) 10 MG Suppository Place 1 suppository (10 mg) rectally daily as needed for constipation  Qty: 3 suppository, Refills: 0    Associated Diagnoses: Constipation due to pain medication therapy      acetaminophen (TYLENOL) 160 MG/5ML solution Take 30.45 mLs (975 mg) by mouth every 8 hours  Qty: 500 mL, Refills: 0    Associated Diagnoses: Acute post-operative pain      docusate (COLACE) 50 MG/5ML liquid Take 10 mLs (100 mg) by mouth 2 times daily  Qty: 300 mL, Refills: 0    Associated Diagnoses: Bowel habit changes      oxyCODONE (ROXICODONE) 5 MG/5ML solution Take 5-10 mLs (5-10 mg) by mouth every 4 hours as needed for moderate to severe pain  Qty: 350 mL, Refills: 0    Associated Diagnoses: Acute post-operative pain      NORTRIPTYLINE HCL PO Take 25 mg by mouth At Bedtime      HYDROCHLOROTHIAZIDE  PO Take 12.5 mg by mouth daily       PRAMIPEXOLE DIHYDROCHLORIDE PO Take 0.25 mg by mouth daily         STOP taking these medications       sennosides (SENOKOT) 8.8 MG/5ML syrup Comments:   Reason for Stopping:               HOME HEALTH CARE:  Not needed    - - - - - - - - - - - - - - - - - -  Saloni Saldivar MD  General Surgery PGY-1

## 2017-03-29 ENCOUNTER — TELEPHONE (OUTPATIENT)
Dept: SURGERY | Facility: CLINIC | Age: 47
End: 2017-03-29

## 2017-03-29 NOTE — TELEPHONE ENCOUNTER
I tried calling patient in response to her VM left saying she had some questions.   I left  with my pager # and direct office # for her to call when able.   Will await her call back.

## 2017-03-31 ENCOUNTER — OFFICE VISIT (OUTPATIENT)
Dept: SURGERY | Facility: CLINIC | Age: 47
End: 2017-03-31
Attending: CLINICAL NURSE SPECIALIST
Payer: MEDICARE

## 2017-03-31 VITALS
DIASTOLIC BLOOD PRESSURE: 72 MMHG | OXYGEN SATURATION: 97 % | RESPIRATION RATE: 18 BRPM | SYSTOLIC BLOOD PRESSURE: 108 MMHG | WEIGHT: 196.4 LBS | HEIGHT: 69 IN | BODY MASS INDEX: 29.09 KG/M2 | HEART RATE: 86 BPM | TEMPERATURE: 97.5 F

## 2017-03-31 DIAGNOSIS — K44.9 PARAESOPHAGEAL HERNIA: Primary | ICD-10-CM

## 2017-03-31 PROCEDURE — 99212 OFFICE O/P EST SF 10 MIN: CPT | Mod: ZF

## 2017-03-31 ASSESSMENT — ENCOUNTER SYMPTOMS
BLOOD IN STOOL: 0
HEMOPTYSIS: 0
SYNCOPE: 0
LOSS OF CONSCIOUSNESS: 0
MUSCLE CRAMPS: 0
TACHYCARDIA: 0
POSTURAL DYSPNEA: 0
PARALYSIS: 0
HYPOTENSION: 0
SINUS CONGESTION: 0
POLYPHAGIA: 0
SEIZURES: 0
CLAUDICATION: 0
SINUS PAIN: 0
ARTHRALGIAS: 0
RECTAL PAIN: 0
CHILLS: 1
SLEEP DISTURBANCES DUE TO BREATHING: 0
RESPIRATORY PAIN: 0
CONSTIPATION: 1
HEADACHES: 1
SORE THROAT: 0
ORTHOPNEA: 0
BLOOD IN STOOL: 0
RESPIRATORY PAIN: 0
JOINT SWELLING: 0
SHORTNESS OF BREATH: 1
STIFFNESS: 0
DECREASED APPETITE: 1
JAUNDICE: 0
TROUBLE SWALLOWING: 0
POLYDIPSIA: 0
HEADACHES: 1
SLEEP DISTURBANCES DUE TO BREATHING: 0
CLAUDICATION: 0
NERVOUS/ANXIOUS: 1
ABDOMINAL PAIN: 1
NECK PAIN: 0
WHEEZING: 0
TASTE DISTURBANCE: 1
COUGH: 1
DECREASED APPETITE: 1
WEIGHT LOSS: 1
SYNCOPE: 0
DIZZINESS: 1
SMELL DISTURBANCE: 0
WEIGHT GAIN: 0
NAUSEA: 0
FEVER: 0
HYPERTENSION: 0
CONSTIPATION: 1
COUGH DISTURBING SLEEP: 0
ALTERED TEMPERATURE REGULATION: 1
TREMORS: 0
EXERCISE INTOLERANCE: 1
MEMORY LOSS: 0
SPUTUM PRODUCTION: 0
SINUS PAIN: 0
EXERCISE INTOLERANCE: 1
MUSCLE WEAKNESS: 0
TROUBLE SWALLOWING: 0
MEMORY LOSS: 0
DISTURBANCES IN COORDINATION: 0
BLOATING: 1
INCREASED ENERGY: 1
HALLUCINATIONS: 0
BOWEL INCONTINENCE: 0
SHORTNESS OF BREATH: 1
VOMITING: 0
LEG PAIN: 0
FATIGUE: 1
HALLUCINATIONS: 0
ALTERED TEMPERATURE REGULATION: 1
BLOATING: 1
SMELL DISTURBANCE: 0
RECTAL PAIN: 0
ARTHRALGIAS: 0
SORE THROAT: 0
WEIGHT GAIN: 0
NECK MASS: 0
TASTE DISTURBANCE: 1
HOARSE VOICE: 0
BACK PAIN: 1
NECK PAIN: 0
WHEEZING: 0
TINGLING: 1
SEIZURES: 0
JOINT SWELLING: 0
LIGHT-HEADEDNESS: 1
COUGH DISTURBING SLEEP: 0
MYALGIAS: 1
JAUNDICE: 0
DIARRHEA: 0
BACK PAIN: 1
SPUTUM PRODUCTION: 0
HYPOTENSION: 0
POLYDIPSIA: 0
LOSS OF CONSCIOUSNESS: 0
DECREASED CONCENTRATION: 0
NERVOUS/ANXIOUS: 1
SNORES LOUDLY: 1
MUSCLE WEAKNESS: 0
LEG PAIN: 0
FEVER: 0
PANIC: 0
INCREASED ENERGY: 1
BOWEL INCONTINENCE: 0
DIARRHEA: 0
DECREASED CONCENTRATION: 0
DIZZINESS: 1
PALPITATIONS: 0
WEAKNESS: 1
MYALGIAS: 1
RECTAL BLEEDING: 0
POLYPHAGIA: 0
HYPERTENSION: 0
RECTAL BLEEDING: 0
DYSPNEA ON EXERTION: 0
DYSPNEA ON EXERTION: 0
WEIGHT LOSS: 1
LEG SWELLING: 1
ABDOMINAL PAIN: 1
TREMORS: 0
NUMBNESS: 1
MUSCLE CRAMPS: 0
NAUSEA: 0
HEMOPTYSIS: 0
INSOMNIA: 0
INSOMNIA: 0
HEARTBURN: 0
CHILLS: 1
ORTHOPNEA: 0
NUMBNESS: 1
NIGHT SWEATS: 0
WEAKNESS: 1
SINUS CONGESTION: 0
LEG SWELLING: 1
VOMITING: 0
PARALYSIS: 0
COUGH: 1
POSTURAL DYSPNEA: 0
DEPRESSION: 1
PANIC: 0
HEARTBURN: 0
SPEECH CHANGE: 0
HOARSE VOICE: 0
TINGLING: 1
TACHYCARDIA: 0
STIFFNESS: 0
DEPRESSION: 1
DISTURBANCES IN COORDINATION: 0
NECK MASS: 0
SPEECH CHANGE: 0
PALPITATIONS: 0
LIGHT-HEADEDNESS: 1
SNORES LOUDLY: 1
FATIGUE: 1
NIGHT SWEATS: 0

## 2017-03-31 ASSESSMENT — PAIN SCALES - GENERAL: PAINLEVEL: MILD PAIN (3)

## 2017-03-31 NOTE — PROGRESS NOTES
"REASON FOR VISIT:   PEG removal    PROCEDURES PERFORMED:    1. Laparoscopic reduction of hiatal hernia.   2. Laparoscopic wedge Uriah gastroplasty.   3. Laparoscopic Nissen fundoplication.   4. Laparoscopic-assisted gastrostomy tube placement.        DATE ABOVE PROCEDURES PERFORMED:  3/13/17    SURGEON:  Dr. Cheryl Schilling    History of Present Illness:   Loli is a 46 year old female who was evaluated for several years of dysphagia and \"swallowing issues\".  She underwent EGD dilation in November 2015 with slight improvement.  After further workup, she underwent the above procedures and was discharged home on 3/16/17.  She returned to ER the next day with a post-operative ileus.  After an aggressive bowel regimen and enemas, her abdominal distention and pain improved and her bowel function resumed.    Assessment:  She is here today with her boyfriend.   She has not needed to vent her PEG since discharge and has fairly normal bowel function.  Her diet is mostly soft but she is hungry and interested in how to safely advance the diet.  She will see Dr. Schilling next week with UGI xray but the PEG is bothering her and she asked for it to be removed.       After explaining procedure and after care, the PEG was removed without complications.  The PEG was inspected and is intact.  A gauze/Tegaderm dressing was applied.   Diet instructions were reviewed and questions answered.    Plan:   Keep appt next week with Dr. Schilling.   Call if any concerns arise.    Total time:  30 minutes.  Answers for HPI/ROS submitted by the patient on 3/31/2017   General Symptoms: Yes  Skin Symptoms: No  HENT Symptoms: Yes  EYE SYMPTOMS: No  HEART SYMPTOMS: Yes  LUNG SYMPTOMS: Yes  INTESTINAL SYMPTOMS: Yes  URINARY SYMPTOMS: No  GYNECOLOGIC SYMPTOMS: No  BREAST SYMPTOMS: No  SKELETAL SYMPTOMS: Yes  BLOOD SYMPTOMS: No  NERVOUS SYSTEM SYMPTOMS: Yes  MENTAL HEALTH SYMPTOMS: Yes  Fever: No  Loss of appetite: Yes  Weight loss: Yes  Weight gain: No  Fatigue: " Yes  Night sweats: No  Chills: Yes  Increased stress: No  Excessive hunger: No  Excessive thirst: No  Feeling hot or cold when others believe the temperature is normal: Yes  Loss of height: No  Post-operative complications: Yes  Surgical site pain: Yes  Hallucinations: No  Change in or Loss of Energy: Yes  Hyperactivity: No  Confusion: No  Ear pain: No  Ear discharge: No  Hearing loss: Yes  Tinnitus: Yes  Nosebleeds: No  Congestion: No  Sinus pain: No  Trouble swallowing: No   Voice hoarseness: No  Mouth sores: No  Sore throat: No  Tooth pain: Yes  Gum tenderness: No  Bleeding gums: No  Change in taste: Yes  Change in sense of smell: No  Dry mouth: No  Hearing aid used: No  Neck lump: No  Cough: Yes  Sputum or phlegm: No  Coughing up blood: No  Difficulty breating or shortness of breath: Yes  Snoring: Yes  Wheezing: No  Difficulty breathing on exertion: No  Respiratory pain: No  Nighttime Cough: No  Difficulty breathing when lying flat: No  Chest pain or pressure: No  Fast or irregular heartbeat: No  Pain in legs with walking: No  Swelling in feet or ankles: Yes  Trouble breathing while lying down: No  High blood pressure: No  Low blood pressure: No  Fainting: No  Murmurs: No  Chest pain on exertion: No  Chest pain at rest: No  Cramping pain in leg during exercise: No  Pacemaker: No  Varicose veins: No  Edema or swelling: Yes  Fast heart beat: No  Wake up at night with shortness of breath: No  Heart flutters: No  Light-headedness: Yes  Exercise intolerance: Yes  Heart burn or indigestion: No  Nausea: No  Vomiting: No  Abdominal pain: Yes  Bloating: Yes  Constipation: Yes  Diarrhea: No  Blood in stool: No  Black stools: No  Rectal or Anal pain: No  Fecal incontinence: No  Rectal bleeding: No  Yellowing of skin or eyes: No  Vomit with blood: No  Change in stools: No  Hemorrhoids: No  Back pain: Yes  Muscle aches: Yes  Neck pain: No  Swollen joints: No  Joint pain: No  Bone pain: No  Muscle cramps: No  Muscle weakness:  No  Joint stiffness: No  Bone fracture: No  Trouble with coordination: No  Dizziness or trouble with balance: Yes  Fainting or black-out spells: No  Memory loss: No  Headache: Yes  Seizures: No  Speech problems: No  Tingling: Yes  Tremor: No  Weakness: Yes  Difficulty walking: Yes  Paralysis: No  Numbness: Yes  Nervous or Anxious: Yes  Depression: Yes  Trouble sleeping: No  Trouble thinking or concentrating: No  Mood changes: Yes  Panic attacks: No

## 2017-03-31 NOTE — NURSING NOTE
"Loli Pearson is a 46 year old female who presents for:  Chief Complaint   Patient presents with     Oncology Clinic Visit     Hiatal hernia        Initial Vitals:  /72 (BP Location: Left arm, Patient Position: Chair, Cuff Size: Adult Regular)  Pulse 86  Temp 97.5  F (36.4  C) (Oral)  Resp 18  Ht 1.753 m (5' 9.02\")  Wt 89.1 kg (196 lb 6.4 oz)  SpO2 97%  BMI 28.99 kg/m2 Estimated body mass index is 28.99 kg/(m^2) as calculated from the following:    Height as of this encounter: 1.753 m (5' 9.02\").    Weight as of this encounter: 89.1 kg (196 lb 6.4 oz).. Body surface area is 2.08 meters squared. BP completed using cuff size: regular  Mild Pain (3) No LMP recorded. Patient has had a hysterectomy. Allergies and medications reviewed.     Medications: Medication refills not needed today.  Pharmacy name entered into [employer]: [id number]    Comments:     7 minutes for nursing intake (face to face time)   Rebecca Carrasco MA        "

## 2017-03-31 NOTE — MR AVS SNAPSHOT
After Visit Summary   3/31/2017    Loli Pearson    MRN: [id number]           Patient Information     Date Of Birth          1970        Visit Information        Provider Department      3/31/2017 12:30 PM Celeste Vasques APRN CNS [facility]        Today's Diagnoses     Paraesophageal hernia    -  1       Follow-ups after your visit        Follow-up notes from your care team     Return in about 1 week (around 4/7/2017).      Your next 10 appointments already scheduled     Apr 06, 2017  1:00 PM CDT   XR UPPER GI with UCXR2, UC GIGU RAD   [facility] ([facility])    [address] [address] [phone]           Please bring a list of your current medicines to your exam. (Include vitamins, minerals and over-the-counter medicines.) Leave your valuables at home.  Tell the doctor if there is a chance you could be pregnant.  The day before the exam:   If you had a barium enema within two days of the exam, you will need to take 3 bisacodyl (Dulcolax) tablets.   Do not eat, drink, chew gum, or smoke for 8 hours before your exam.  The day of your exam:   Keep drinking clear liquids until 2 hours before your exam. Clear liquids include water, clear juice, black coffee or clear tea without milk, Gatorade, clear soda.   Take your usual medicines unless your doctor tells you not to. Take them with small sips of water.  Please call the Imaging Department at your exam site with any questions.            Apr 06, 2017  2:30 PM CDT   (Arrive by 2:15 PM)   Return Visit with Cheryl Schilling MD   [facility] ([facility])    [address] [address] [phone]              Who to contact     If you have questions or need follow up information about today's clinic visit or your schedule please contact [facility]  "directly at [phone].  Normal or non-critical lab and imaging results will be communicated to you by [employer], letter or phone within 4 business days after the clinic has received the results. If you do not hear from us within 7 days, please contact the clinic through [employer] or phone. If you have a critical or abnormal lab result, we will notify you by phone as soon as possible.  Submit refill requests through [employer] or call your pharmacy and they will forward the refill request to us. Please allow 3 business days for your refill to be completed.          Additional Information About Your Visit        [employer] Information     [employer] gives you secure access to your electronic health record. If you see a primary care provider, you can also send messages to your care team and make appointments. If you have questions, please call your primary care clinic.  If you do not have a primary care provider, please call [phone] and they will assist you.        Care EveryWhere ID     This is your Care EveryWhere ID. This could be used by other organizations to access your [facility] medical records  [id number]        Your Vitals Were     Pulse Temperature Respirations Height Pulse Oximetry BMI (Body Mass Index)    86 97.5  F (36.4  C) (Oral) 18 1.753 m (5' 9.02\") 97% 28.99 kg/m2       Blood Pressure from Last 3 Encounters:   03/31/17 108/72   03/20/17 114/74   03/16/17 120/73    Weight from Last 3 Encounters:   03/31/17 89.1 kg (196 lb 6.4 oz)   03/18/17 90.7 kg (200 lb)   03/13/17 93.6 kg (206 lb 5.6 oz)              Today, you had the following     No orders found for display         Today's Medication Changes          These changes are accurate as of: 3/31/17  1:10 PM.  If you have any questions, ask your nurse or doctor.               Stop taking these medicines if you haven't already. Please contact your care team if you have questions.     docusate 50 MG/5ML liquid   Commonly known as:  COLACE   Stopped by:  " Celeste Vasques APRN CNS           ondansetron 4 MG ODT tab   Commonly known as:  ZOFRAN-ODT   Stopped by:  Celeste Vasques APRN CNS                    Primary Care Provider Office Phone # Fax #    Noemy Rizo [phone]       [facility] [address] [facility] [address]        Thank you!     Thank you for choosing [facility]  for your care. Our goal is always to provide you with excellent care. Hearing back from our patients is one way we can continue to improve our services. Please take a few minutes to complete the written survey that you may receive in the mail after your visit with us. Thank you!             Your Updated Medication List - Protect others around you: Learn how to safely use, store and throw away your medicines at www.disposemymeds.org.          This list is accurate as of: 3/31/17  1:10 PM.  Always use your most recent med list.                   Brand Name Dispense Instructions for use    acetaminophen 160 MG/5ML solution    TYLENOL    500 mL    Take 30.45 mLs (975 mg) by mouth every 8 hours       bisacodyl 10 MG Suppository    DULCOLAX    3 suppository    Place 1 suppository (10 mg) rectally daily as needed for constipation       HYDROCHLOROTHIAZIDE PO      Take 12.5 mg by mouth daily       NORTRIPTYLINE HCL PO      Take 25 mg by mouth At Bedtime       oxyCODONE 5 MG/5ML solution    ROXICODONE    350 mL    Take 5-10 mLs (5-10 mg) by mouth every 4 hours as needed for moderate to severe pain       polyethylene glycol powder    MIRALAX    500 g    Take 17 g (1 capful) by mouth daily       PRAMIPEXOLE DIHYDROCHLORIDE PO      Take 0.25 mg by mouth daily       sennosides 8.6 MG tablet    SENOKOT    120 each    Take 1-2 tablets by mouth 2 times daily Crush pill.

## 2017-03-31 NOTE — LETTER
"/31/2017       RE: Loli Pearson  [address] [place] [address]     Dear Colleague,    Thank you for referring your patient, Loli Pearson, to the [facility]ER CLINIC. Please see a copy of my visit note below.    REASON FOR VISIT:   PEG removal    PROCEDURES PERFORMED:    1. Laparoscopic reduction of hiatal hernia.   2. Laparoscopic wedge Uriah gastroplasty.   3. Laparoscopic Nissen fundoplication.   4. Laparoscopic-assisted gastrostomy tube placement.        DATE ABOVE PROCEDURES PERFORMED:  3/13/17    SURGEON:  Dr. Cheryl Schilling    History of Present Illness:   Loli is a 46 year old female who was evaluated for several years of dysphagia and \"swallowing issues\".  She underwent EGD dilation in November 2015 with slight improvement.  After further workup, she underwent the above procedures and was discharged home on 3/16/17.  She returned to ER the next day with a post-operative ileus.  After an aggressive bowel regimen and enemas, her abdominal distention and pain improved and her bowel function resumed.    Assessment:  She is here today with her boyfriend.   She has not needed to vent her PEG since discharge and has fairly normal bowel function.  Her diet is mostly soft but she is hungry and interested in how to safely advance the diet.  She will see Dr. Schilling next week with UGI xray but the PEG is bothering her and she asked for it to be removed.       After explaining procedure and after care, the PEG was removed without complications.  The PEG was inspected and is intact.  A gauze/Tegaderm dressing was applied.   Diet instructions were reviewed and questions answered.    Plan:   Keep appt next week with Dr. Schilling.   Call if any concerns arise.    Total time:  30 minutes.  Answers for HPI/ROS submitted by the patient on 3/31/2017   General Symptoms: Yes  Skin Symptoms: No  HENT Symptoms: Yes  EYE SYMPTOMS: No  HEART SYMPTOMS: Yes  LUNG SYMPTOMS: Yes  INTESTINAL SYMPTOMS: Yes  URINARY " SYMPTOMS: No  GYNECOLOGIC SYMPTOMS: No  BREAST SYMPTOMS: No  SKELETAL SYMPTOMS: Yes  BLOOD SYMPTOMS: No  NERVOUS SYSTEM SYMPTOMS: Yes  MENTAL HEALTH SYMPTOMS: Yes  Fever: No  Loss of appetite: Yes  Weight loss: Yes  Weight gain: No  Fatigue: Yes  Night sweats: No  Chills: Yes  Increased stress: No  Excessive hunger: No  Excessive thirst: No  Feeling hot or cold when others believe the temperature is normal: Yes  Loss of height: No  Post-operative complications: Yes  Surgical site pain: Yes  Hallucinations: No  Change in or Loss of Energy: Yes  Hyperactivity: No  Confusion: No  Ear pain: No  Ear discharge: No  Hearing loss: Yes  Tinnitus: Yes  Nosebleeds: No  Congestion: No  Sinus pain: No  Trouble swallowing: No   Voice hoarseness: No  Mouth sores: No  Sore throat: No  Tooth pain: Yes  Gum tenderness: No  Bleeding gums: No  Change in taste: Yes  Change in sense of smell: No  Dry mouth: No  Hearing aid used: No  Neck lump: No  Cough: Yes  Sputum or phlegm: No  Coughing up blood: No  Difficulty breating or shortness of breath: Yes  Snoring: Yes  Wheezing: No  Difficulty breathing on exertion: No  Respiratory pain: No  Nighttime Cough: No  Difficulty breathing when lying flat: No  Chest pain or pressure: No  Fast or irregular heartbeat: No  Pain in legs with walking: No  Swelling in feet or ankles: Yes  Trouble breathing while lying down: No  High blood pressure: No  Low blood pressure: No  Fainting: No  Murmurs: No  Chest pain on exertion: No  Chest pain at rest: No  Cramping pain in leg during exercise: No  Pacemaker: No  Varicose veins: No  Edema or swelling: Yes  Fast heart beat: No  Wake up at night with shortness of breath: No  Heart flutters: No  Light-headedness: Yes  Exercise intolerance: Yes  Heart burn or indigestion: No  Nausea: No  Vomiting: No  Abdominal pain: Yes  Bloating: Yes  Constipation: Yes  Diarrhea: No  Blood in stool: No  Black stools: No  Rectal or Anal pain: No  Fecal incontinence: No  Rectal  bleeding: No  Yellowing of skin or eyes: No  Vomit with blood: No  Change in stools: No  Hemorrhoids: No  Back pain: Yes  Muscle aches: Yes  Neck pain: No  Swollen joints: No  Joint pain: No  Bone pain: No  Muscle cramps: No  Muscle weakness: No  Joint stiffness: No  Bone fracture: No  Trouble with coordination: No  Dizziness or trouble with balance: Yes  Fainting or black-out spells: No  Memory loss: No  Headache: Yes  Seizures: No  Speech problems: No  Tingling: Yes  Tremor: No  Weakness: Yes  Difficulty walking: Yes  Paralysis: No  Numbness: Yes  Nervous or Anxious: Yes  Depression: Yes  Trouble sleeping: No  Trouble thinking or concentrating: No  Mood changes: Yes  Panic attacks: No    Again, thank you for allowing me to participate in the care of your patient.      Sincerely,    BRITTANY Morrison CNS

## 2017-04-05 NOTE — PROGRESS NOTES
THORACIC SURGERY FOLLOW UP VISIT    Dear Dr. Parmar,  I saw Ms. Loli Pearson in follow-up today. The clinical summary follows:     PREOP DIAGNOSIS   Hiatal hernia  PROCEDURE   Lap reduction of hiatal hernia, wedge shad gastroplasty, lap nissen fundoplication, gastrostomy tube placement  DATE OF PROCEDURE  3/13/17    COMPLICATIONS  Re-admit for ileus 3/18, d/c 3/20    INTERVAL STUDIES  UPPER GI SERIES 4/6/2017 1:49 PM       IMPRESSION:  No contrast leak or stenosis at the gastroplasty site. The fundoplasty  wrap is intact.    ETOH rare  TOB never      SUBJECTIVE  Patient is generally doing well, however, she does mention that she has a constant pain on the left side of her abdomen, not related to eating. She has no difficulty swallowing but does complain of not being able to burp. Patient is tolerating a regular diet, self-restricting in quantity; she is taking small, frequent meals as instructed. Notes that she has been losing some weight. Her gastrostomy tube was removed  one week ago (3/31/2017).    From a personal perspective, the patient is accompanied by her .    IMPRESSION (K44.9) Hiatal hernia  (primary encounter diagnosis)    .  46 year-old female with hiatal hernia, now 3.5 weeks s/p laparoscopic reduction of hiatal hernia, wedge shad gastroplasty, Nissen fundoplication, and gastrostomy tube placement, now progressing as expected.    PLAN  I spent a total of 25 minutes with Ms. Loli Pearson and her , more than 50% of which were spent in counseling, coordination of care, and face-to-face time. I reviewed the plan as follows:  1) Abdominal pain most likely expected, post-surgical pain, particularly since the patient is not having difficulty swallowing and has a history of chronic pain syndrome, which may be exacerbated by surgery.  Necessary Tests & Appointments: Follow-up with thoracic surgery if further concerns arise  The patient and her  had all their questions answered  and were in agreement with the plan.  I appreciate the opportunity to participate in the care of your patient and will keep you updated.  Sincerely,

## 2017-04-06 ENCOUNTER — OFFICE VISIT (OUTPATIENT)
Dept: SURGERY | Facility: CLINIC | Age: 47
End: 2017-04-06
Attending: STUDENT IN AN ORGANIZED HEALTH CARE EDUCATION/TRAINING PROGRAM
Payer: MEDICARE

## 2017-04-06 DIAGNOSIS — K44.9 HIATAL HERNIA: Primary | ICD-10-CM

## 2017-04-06 PROCEDURE — 40000114 ZZH STATISTIC NO CHARGE CLINIC VISIT

## 2017-04-06 NOTE — MR AVS SNAPSHOT
After Visit Summary   4/6/2017    Loli Pearson    MRN: [id number]           Patient Information     Date Of Birth          1970        Visit Information        Provider Department      4/6/2017 2:30 PM Cheryl Schilling MD [facility]         Follow-ups after your visit        Your next 10 appointments already scheduled     Apr 06, 2017  1:00 PM CDT   XR UPPER GI with UCXR2, UC GIGU RAD   [facility] [place] [facility] ([facility])    [address] [facility]  [address] [facility] [address] [phone]           Please bring a list of your current medicines to your exam. (Include vitamins, minerals and over-the-counter medicines.) Leave your valuables at home.  Tell the doctor if there is a chance you could be pregnant.  The day before the exam:   If you had a barium enema within two days of the exam, you will need to take 3 bisacodyl (Dulcolax) tablets.   Do not eat, drink, chew gum, or smoke for 8 hours before your exam.  The day of your exam:   Keep drinking clear liquids until 2 hours before your exam. Clear liquids include water, clear juice, black coffee or clear tea without milk, Gatorade, clear soda.   Take your usual medicines unless your doctor tells you not to. Take them with small sips of water.  Please call the Imaging Department at your exam site with any questions.            Apr 06, 2017  2:30 PM CDT   (Arrive by 2:15 PM)   Return Visit with Cheryl Schilling MD   [facility] ([facility])    [address] [address] [phone]              Who to contact     If you have questions or need follow up information about today's clinic visit or your schedule please contact [facility] directly at [phone].  Normal or non-critical lab and imaging results will be communicated to you by MyChart, letter or phone within 4 business days after  the clinic has received the results. If you do not hear from us within 7 days, please contact the clinic through [employer] or phone. If you have a critical or abnormal lab result, we will notify you by phone as soon as possible.  Submit refill requests through [employer] or call your pharmacy and they will forward the refill request to us. Please allow 3 business days for your refill to be completed.          Additional Information About Your Visit        [employer] Information     [employer] gives you secure access to your electronic health record. If you see a primary care provider, you can also send messages to your care team and make appointments. If you have questions, please call your primary care clinic.  If you do not have a primary care provider, please call [phone] and they will assist you.        Care EveryWhere ID     This is your Care EveryWhere ID. This could be used by other organizations to access your [place] [facility] medical records  [id number]         Blood Pressure from Last 3 Encounters:   03/20/17 114/74   03/16/17 120/73   01/26/17 121/77    Weight from Last 3 Encounters:   03/18/17 90.7 kg (200 lb)   03/13/17 93.6 kg (206 lb 5.6 oz)   01/26/17 95.4 kg (210 lb 6.4 oz)              Today, you had the following     No orders found for display       Primary Care Provider Office Phone # Fax #    Noemy Rizo [phone]       [facility] [address] [address]        Thank you!     Thank you for choosing [facility]  for your care. Our goal is always to provide you with excellent care. Hearing back from our patients is one way we can continue to improve our services. Please take a few minutes to complete the written survey that you may receive in the mail after your visit with us. Thank you!             Your Updated Medication List - Protect others around you: Learn how to safely use, store and throw away your medicines at www.disposemymeds.org.           This list is accurate as of: 3/20/17 10:50 AM.  Always use your most recent med list.                   Brand Name Dispense Instructions for use    acetaminophen 160 MG/5ML solution    TYLENOL    500 mL    Take 30.45 mLs (975 mg) by mouth every 8 hours       bisacodyl 10 MG Suppository    DULCOLAX    3 suppository    Place 1 suppository (10 mg) rectally daily as needed for constipation       docusate 50 MG/5ML liquid    COLACE    300 mL    Take 10 mLs (100 mg) by mouth 2 times daily       HYDROCHLOROTHIAZIDE PO      Take 12.5 mg by mouth daily       NORTRIPTYLINE HCL PO      Take 25 mg by mouth At Bedtime       ondansetron 4 MG ODT tab    ZOFRAN-ODT    60 tablet    Take 1 tablet (4 mg) by mouth every 6 hours as needed for nausea       oxyCODONE 5 MG/5ML solution    ROXICODONE    350 mL    Take 5-10 mLs (5-10 mg) by mouth every 4 hours as needed for moderate to severe pain       polyethylene glycol powder    MIRALAX    500 g    Take 17 g (1 capful) by mouth daily       PRAMIPEXOLE DIHYDROCHLORIDE PO      Take 0.25 mg by mouth daily       sennosides 8.6 MG tablet    SENOKOT    120 each    Take 1-2 tablets by mouth 2 times daily Crush pill.

## 2017-04-06 NOTE — LETTER
4/6/2017       RE: Loli Pearson  [address] [place] [id number]     Dear Colleague,    Thank you for referring your patient, Loli Pearson, to the [facility]CER CLINIC. Please see a copy of my visit note below.    THORACIC SURGERY FOLLOW UP VISIT    Dear Dr. Parmar,  I saw Ms. Loli Pearson in follow-up today. The clinical summary follows:     PREOP DIAGNOSIS   Hiatal hernia  PROCEDURE   Lap reduction of hiatal hernia, wedge shad gastroplasty, lap nissen fundoplication, gastrostomy tube placement  DATE OF PROCEDURE  3/13/17    COMPLICATIONS  Re-admit for ileus 3/18, d/c 3/20    INTERVAL STUDIES  UPPER GI SERIES 4/6/2017 1:49 PM       IMPRESSION:  No contrast leak or stenosis at the gastroplasty site. The fundoplasty  wrap is intact.    ETOH rare  TOB never      SUBJECTIVE  Patient is generally doing well, however, she does mention that she has a constant pain on the left side of her abdomen, not related to eating. She has no difficulty swallowing but does complain of not being able to burp. Patient is tolerating a regular diet, self-restricting in quantity; she is taking small, frequent meals as instructed. Notes that she has been losing some weight. Her gastrostomy tube was removed  one week ago (3/31/2017).    From a personal perspective, the patient is accompanied by her .    IMPRESSION (K44.9) Hiatal hernia  (primary encounter diagnosis)    .  46 year-old female with hiatal hernia, now 3.5 weeks s/p laparoscopic reduction of hiatal hernia, wedge shad gastroplasty, Nissen fundoplication, and gastrostomy tube placement, now progressing as expected.    PLAN  I spent a total of 25 minutes with Ms. Loli Pearson and her , more than 50% of which were spent in counseling, coordination of care, and face-to-face time. I reviewed the plan as follows:  1) Abdominal pain most likely expected, post-surgical pain, particularly since the patient is not having difficulty  swallowing and has a history of chronic pain syndrome, which may be exacerbated by surgery.  Necessary Tests & Appointments: Follow-up with thoracic surgery if further concerns arise  The patient and her  had all their questions answered and were in agreement with the plan.  I appreciate the opportunity to participate in the care of your patient and will keep you updated.  Sincerely,        Cheryl Schilling MD

## 2017-04-07 VITALS — RESPIRATION RATE: 12 BRPM | OXYGEN SATURATION: 96 % | HEIGHT: 68 IN

## 2017-07-07 ENCOUNTER — TRANSFERRED RECORDS (OUTPATIENT)
Dept: HEALTH INFORMATION MANAGEMENT | Facility: CLINIC | Age: 47
End: 2017-07-07

## 2017-07-09 ASSESSMENT — ENCOUNTER SYMPTOMS
SEIZURES: 0
TINGLING: 0
SINUS PAIN: 0
SORE THROAT: 1
PALPITATIONS: 1
HOARSE VOICE: 0
NECK PAIN: 0
MYALGIAS: 1
TACHYCARDIA: 0
MEMORY LOSS: 0
SYNCOPE: 0
HEARTBURN: 0
BLOOD IN STOOL: 1
ARTHRALGIAS: 0
LIGHT-HEADEDNESS: 1
CLAUDICATION: 0
TROUBLE SWALLOWING: 0
VOMITING: 0
ORTHOPNEA: 0
SKIN CHANGES: 0
POOR WOUND HEALING: 0
RECTAL BLEEDING: 0
JOINT SWELLING: 0
RECTAL PAIN: 0
NAUSEA: 1
DIZZINESS: 1
DECREASED CONCENTRATION: 1
MUSCLE WEAKNESS: 0
MUSCLE CRAMPS: 0
INSOMNIA: 1
DIARRHEA: 0
LEG PAIN: 1
NERVOUS/ANXIOUS: 1
WEIGHT LOSS: 1
PANIC: 0
SLEEP DISTURBANCES DUE TO BREATHING: 0
PARALYSIS: 0
WEAKNESS: 0
BLOATING: 1
DEPRESSION: 1
NECK MASS: 0
ABDOMINAL PAIN: 1
DISTURBANCES IN COORDINATION: 0
HYPOTENSION: 1
SMELL DISTURBANCE: 0
CONSTIPATION: 1
BACK PAIN: 0
NAIL CHANGES: 0
TREMORS: 0
HEADACHES: 1
LEG SWELLING: 1
SPEECH CHANGE: 0
JAUNDICE: 0
STIFFNESS: 0
EXERCISE INTOLERANCE: 0
BOWEL INCONTINENCE: 0
TASTE DISTURBANCE: 0
LOSS OF CONSCIOUSNESS: 0
HYPERTENSION: 0
NUMBNESS: 0

## 2017-07-13 ENCOUNTER — OFFICE VISIT (OUTPATIENT)
Dept: SURGERY | Facility: CLINIC | Age: 47
End: 2017-07-13
Attending: STUDENT IN AN ORGANIZED HEALTH CARE EDUCATION/TRAINING PROGRAM
Payer: MEDICARE

## 2017-07-13 VITALS
BODY MASS INDEX: 28.4 KG/M2 | WEIGHT: 186.8 LBS | DIASTOLIC BLOOD PRESSURE: 80 MMHG | SYSTOLIC BLOOD PRESSURE: 119 MMHG | TEMPERATURE: 98 F | HEART RATE: 70 BPM | OXYGEN SATURATION: 97 % | RESPIRATION RATE: 16 BRPM

## 2017-07-13 DIAGNOSIS — K44.9 HIATAL HERNIA: Primary | ICD-10-CM

## 2017-07-13 PROCEDURE — 99212 OFFICE O/P EST SF 10 MIN: CPT | Mod: ZF

## 2017-07-13 RX ORDER — BUPROPION HYDROCHLORIDE 150 MG/1
TABLET, EXTENDED RELEASE ORAL
COMMUNITY

## 2017-07-13 RX ORDER — TRAZODONE HYDROCHLORIDE 50 MG/1
TABLET, FILM COATED ORAL
COMMUNITY
End: 2018-09-06

## 2017-07-13 RX ORDER — PROPRANOLOL HCL 60 MG
CAPSULE, EXTENDED RELEASE 24HR ORAL
COMMUNITY
Start: 2015-08-24

## 2017-07-13 RX ORDER — DULOXETIN HYDROCHLORIDE 30 MG/1
CAPSULE, DELAYED RELEASE ORAL
COMMUNITY
Start: 2015-09-21

## 2017-07-13 ASSESSMENT — PAIN SCALES - GENERAL: PAINLEVEL: MODERATE PAIN (4)

## 2017-07-13 NOTE — PROGRESS NOTES
THORACIC SURGERY FOLLOW UP VISIT    Dear Dr. Parmar,  I saw Ms. Loli Pearson in follow-up today. The clinical summary follows:     PREOP DIAGNOSIS   Hiatal hernia  PROCEDURE   Lap reduction of hiatal hernia, wedge shad gastroplasty, lap nissen fundoplication, gastrostomy tube placement  DATE OF PROCEDURE  3/13/17    COMPLICATIONS  Re-admit for ileus 3/18, d/c 3/20      ETOH rare  TOB never      SUBJECTIVE  Ms. Pearson is a patient who had a lap repair of paraesophageal hernia and Nissen fundoplication on 3/13/2017. She was seen in clinic for 3 week follow-up with PEG tube removal. She has since been having daily episodes of abdominal pain she describes as achy and cramping in her upper abdomen. She denies any symptoms similar to pre-repair. She says it has been getting worse in the past several weeks. Her PCP recently prescribed her omeprazole and sucralfate this past week. She says it has not helped so far.   She has also had some unintentional weight loss.    From a personal perspective, the patient is here alone today.    IMPRESSION (K44.9) Hiatal hernia  (primary encounter diagnosis)    .  46 year-old female with hiatal hernia, now 3.5 weeks s/p laparoscopic reduction of hiatal hernia, wedge shad gastroplasty, Nissen fundoplication, and gastrostomy tube placement, now with chronic abdominal pain    PLAN  I spent a total of 25 minutes with Ms. Loli Pearson, more than 50% of which were spent in counseling, coordination of care, and face-to-face time. I reviewed the plan as follows:  Abdominal pain most likely post-surgical pain, particularly since the patient is not having difficulty swallowing and has a history of chronic pain syndrome, which may be exacerbated by surgery.  Advised to continue PPI/Carafate regimen  If no improvement in 2-3 weeks will plan on EGD  The patient and her  had all their questions answered and were in agreement with the plan.  I appreciate the opportunity to  participate in the care of your patient and will keep you updated.  Sincerely,

## 2017-07-13 NOTE — NURSING NOTE
"Oncology Rooming Note    July 13, 2017 12:23 PM   Loli Pearson is a 46 year old female who presents for:    Chief Complaint   Patient presents with     Oncology Clinic Visit     Ileus following gastrointestinal surgery     Initial Vitals: /80  Pulse 70  Temp 98  F (36.7  C) (Oral)  Resp 16  Wt 84.7 kg (186 lb 12.8 oz)  SpO2 97%  BMI 28.4 kg/m2 Estimated body mass index is 28.4 kg/(m^2) as calculated from the following:    Height as of 4/6/17: 1.727 m (5' 8\").    Weight as of this encounter: 84.7 kg (186 lb 12.8 oz). Body surface area is 2.02 meters squared.  Moderate Pain (4) Comment: Upper Abdomen   No LMP recorded. Patient has had a hysterectomy.  Allergies reviewed: Yes  Medications reviewed: Yes    Medications: Medication refills not needed today.  Pharmacy name entered into [facility]: [facility] [address] [place] [address]    Clinical concerns:      6 minutes for nursing intake (face to face time)     Porsche Burrell CMA              "

## 2017-07-13 NOTE — MR AVS SNAPSHOT
After Visit Summary   7/13/2017    Loli Pearson    MRN: [id number]           Patient Information     Date Of Birth          1970        Visit Information        Provider Department      7/13/2017 12:30 PM Cheryl Schilling MD [facility]        Today's Diagnoses     Hiatal hernia    -  1       Follow-ups after your visit        Who to contact     If you have questions or need follow up information about today's clinic visit or your schedule please contact [facility] directly at [phone].  Normal or non-critical lab and imaging results will be communicated to you by GroupSpaceshart, letter or phone within 4 business days after the clinic has received the results. If you do not hear from us within 7 days, please contact the clinic through musiXmatcht or phone. If you have a critical or abnormal lab result, we will notify you by phone as soon as possible.  Submit refill requests through Glycode or call your pharmacy and they will forward the refill request to us. Please allow 3 business days for your refill to be completed.          Additional Information About Your Visit        MyChart Information     Glycode gives you secure access to your electronic health record. If you see a primary care provider, you can also send messages to your care team and make appointments. If you have questions, please call your primary care clinic.  If you do not have a primary care provider, please call [phone] and they will assist you.        Care EveryWhere ID     This is your Care EveryWhere ID. This could be used by other organizations to access your [facility] medical records  [id number]        Your Vitals Were     Pulse Temperature Respirations Pulse Oximetry BMI (Body Mass Index)       70 98  F (36.7  C) (Oral) 16 97% 28.4 kg/m2        Blood Pressure from Last 3 Encounters:   07/13/17 119/80   04/06/17 (P) 106/73   03/31/17 108/72    Weight from Last 3 Encounters:    07/13/17 84.7 kg (186 lb 12.8 oz)   04/06/17 (P) 88.4 kg (194 lb 12.8 oz)   03/31/17 89.1 kg (196 lb 6.4 oz)              Today, you had the following     No orders found for display       Primary Care Provider Office Phone # Fax #    Noemy Rizo [phone]       [facility] [address] [facility] [address]        Equal Access to Services     YULIA MENDES : Hadii aad ku hadasho Soomaali, waaxda luqadaha, qaybta kaalmada adeegyada, waxay idiin hayaan blayne arteaga . So [facility] [phone].    ATENCIÓN: Si habla español, tiene a aldana disposición servicios gratuitos de asistencia lingüística. Llame al [phone].    We comply with applicable federal civil rights laws and [place] laws. We do not discriminate on the basis of race, color, national origin, age, disability sex, sexual orientation or gender identity.            Thank you!     Thank you for choosing [facility]  for your care. Our goal is always to provide you with excellent care. Hearing back from our patients is one way we can continue to improve our services. Please take a few minutes to complete the written survey that you may receive in the mail after your visit with us. Thank you!             Your Updated Medication List - Protect others around you: Learn how to safely use, store and throw away your medicines at www.disposemymeds.org.          This list is accurate as of: 7/13/17  3:50 PM.  Always use your most recent med list.                   Brand Name Dispense Instructions for use Diagnosis    acetaminophen 32 mg/mL solution    TYLENOL    500 mL    Take 30.45 mLs (975 mg) by mouth every 8 hours    Acute post-operative pain       buPROPion 150 MG 12 hr tablet    WELLBUTRIN SR          DULoxetine 30 MG EC capsule    CYMBALTA          HYDROCHLOROTHIAZIDE PO      Take 12.5 mg by mouth daily        NORTRIPTYLINE HCL PO      Take 25 mg by mouth At Bedtime        oxyCODONE 5 MG/5ML solution     ROXICODONE    350 mL    Take 5-10 mLs (5-10 mg) by mouth every 4 hours as needed for moderate to severe pain    Acute post-operative pain       polyethylene glycol powder    MIRALAX    500 g    Take 17 g (1 capful) by mouth daily    Adynamic ileus (H)       PRAMIPEXOLE DIHYDROCHLORIDE PO      Take 0.25 mg by mouth daily        propranolol 60 MG 24 hr capsule    INDERAL LA          sennosides 8.6 MG tablet    SENOKOT    120 each    Take 1-2 tablets by mouth 2 times daily Crush pill.    Adynamic ileus (H)       traZODone 50 MG tablet    DESYREL

## 2017-07-28 ENCOUNTER — TELEPHONE (OUTPATIENT)
Dept: SURGERY | Facility: CLINIC | Age: 47
End: 2017-07-28

## 2017-07-28 NOTE — TELEPHONE ENCOUNTER
Call to Loli to see how her symptoms have been. There was no answer. Message left with call back information.

## 2017-08-16 ENCOUNTER — TELEPHONE (OUTPATIENT)
Dept: SURGERY | Facility: CLINIC | Age: 47
End: 2017-08-16

## 2017-08-16 DIAGNOSIS — Z87.19 S/P HERNIA REPAIR: Primary | ICD-10-CM

## 2017-08-16 DIAGNOSIS — Z98.890 S/P HERNIA REPAIR: Primary | ICD-10-CM

## 2017-08-16 NOTE — TELEPHONE ENCOUNTER
oLli returned my message (that I had left at the end of July) stating that her symptoms are not any better. When she saw Dr. Schilling in July, the plan was to do an EGD if her abdominal pain did not improve. I will have our  arrange for her to have an EGD.

## 2017-08-18 ENCOUNTER — TELEPHONE (OUTPATIENT)
Dept: SURGERY | Facility: CLINIC | Age: 47
End: 2017-08-18

## 2017-08-18 NOTE — TELEPHONE ENCOUNTER
Spoke Loli about the timing of her upcoming procedure with Dr. Schilling, we decided on 9/13 for surgery and she is aware she needs a pre-op physical with her priamry before surgery, she has my number [phone]

## 2017-09-11 ENCOUNTER — TELEPHONE (OUTPATIENT)
Dept: SURGERY | Facility: CLINIC | Age: 47
End: 2017-09-11

## 2017-09-11 NOTE — TELEPHONE ENCOUNTER
"Received a voicemail from Loli stating she has been \"feeling much better\" and is wondering if she should \"bother having an EGD\" with Dr. Schilling this Wednesday since her symptoms are improving. I called her back but there was no answer. Message left with call back information.  "

## 2017-09-12 NOTE — TELEPHONE ENCOUNTER
"Call from Loli stating she is feeling much better and does not want to have the procedure with Dr. Schilling tomorrow. She says she wants to \"leave well enough alone\" and will call if she develops symptoms in the future.  "

## 2018-01-07 ENCOUNTER — HEALTH MAINTENANCE LETTER (OUTPATIENT)
Age: 48
End: 2018-01-07

## 2018-03-14 NOTE — IP AVS SNAPSHOT
[facility] [place], Endoscopy    [address] [facility] [address]    Phone:  [phone]                                       After Visit Summary   2/15/2017    Loli Pearson    MRN: [id number]           After Visit Summary Signature Page     I have received my discharge instructions, and my questions have been answered. I have discussed any challenges I see with this plan with the nurse or doctor.    ..........................................................................................................................................  Patient/Patient Representative Signature      ..........................................................................................................................................  Patient Representative Print Name and Relationship to Patient    ..................................................               ................................................  Date                                            Time    ..........................................................................................................................................  Reviewed by Signature/Title    ...................................................              ..............................................  Date                                                            Time           Date of Hospital Visit: [unfilled]ENC@  Encounter Provider: Carri Menchaca MD  Place of Service: [facility] CARDIOLOGY  Patient Name: Juan Payne  :1960  Referral Provider: Mahamed Tolbert MD    Chief complaint    History of Present Illness      Mr. Payne is 57-year-old gentleman with type 1 diabetes mellitus on insulin for the last 27 years.  He stopped taking one formed insulin insulin for a week and presented with diabetic ketoacidosis.  He was fatigued and vomiting.  This was treated.  He was noted on exam have a murmur.  He has a history of hypertension but does not take medication for this for quite some time and does not know his lipid status.    He smokes pack and a half of cigarettes a day and drinks a pint of hard alcohol every day.  He lives with his girlfriend.  He recently lost his job because he keeps missing work.  He said over the past year, he's become so fatigued that he can't do his work.  Prior to that he was fine.  Physical Mold the lawn and he doesn't go to grocery store.  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  He said he just got to the point where he was too fatigued to even do that.    He's had no dizziness or syncope.  He has no real chest tightness but has been very short winded.  He's had no fevers chills or cough.    There is no family history of premature cardiovascular disease.      Echo done 3/14/18 shows     · Left ventricular wall thickness is consistent with severe concentric hypertrophy.  · Left ventricular systolic function is hyperdynamic (EF > 70).  · Left ventricular diastolic dysfunction (grade I) consistent with impaired relaxation.  · The aortic valve is abnormal in structure. There is calcification of the aortic valve.No aortic valve regurgitation is present. Severe aortic valve stenosis is present. The aortic peak velocity is 5m/s with a maximal pressure gradient of 122mmHg and mean pressure gradient of 62mmHg. The  ALICIA is 0.54cm2 with a dimensionless index of 0.2.    Past Medical History:   Diagnosis Date   • Diabetes mellitus        Past Surgical History:   Procedure Laterality Date   • TESTICLE SURGERY         Prescriptions Prior to Admission   Medication Sig Dispense Refill Last Dose   • insulin glargine (LANTUS) 100 UNIT/ML injection Inject 15 Units under the skin 2 (Two) Times a Day. Patient states he is taking 18-21 units TID      • insulin lispro (humaLOG) 100 UNIT/ML injection Inject 18-21 Units under the skin 3 (Three) Times a Day Before Meals.      • insulin regular (humuLIN R,novoLIN R) 100 UNIT/ML injection Inject  under the skin 2 (Two) Times a Day Before Meals.          Current Meds  Scheduled Meds:  enoxaparin 40 mg Subcutaneous Nightly   insulin aspart 0-4 Units Subcutaneous 4x Daily AC & at Bedtime   insulin aspart 4 Units Subcutaneous TID With Meals   insulin detemir 12 Units Subcutaneous QAM   insulin detemir 6 Units Subcutaneous Nightly   multivitamin 1 tablet Oral Daily     Continuous Infusions:   PRN Meds:.•  acetaminophen  •  dextrose  •  dextrose  •  dextrose  •  glucagon (human recombinant)  •  LORazepam  •  LORazepam  •  magnesium sulfate **OR** magnesium sulfate **OR** magnesium sulfate  •  ondansetron **OR** ondansetron ODT **OR** ondansetron  •  potassium & sodium phosphates **OR** potassium & sodium phosphates  •  potassium phosphate infusion greater than 15 mMoles **OR** potassium phosphate infusion greater than 15 mMoles **OR** potassium phosphate **OR** sodium phosphate IVPB **OR** sodium phosphate IVPB **OR** sodium phosphate IVPB  •  promethazine  •  sodium chloride  •  sodium chloride    Allergies as of 03/08/2018   • (No Known Allergies)       Social History     Social History   • Marital status: Single     Spouse name: N/A   • Number of children: N/A   • Years of education: N/A     Occupational History   • Not on file.     Social History Main Topics   • Smoking status: Current Every Day  "Smoker     Packs/day: 1.50   • Smokeless tobacco: Never Used   • Alcohol use 3.6 oz/week     6 Shots of liquor per week      Comment: pt states 1 pint/day   • Drug use: No   • Sexual activity: Defer     Other Topics Concern   • Not on file     Social History Narrative   • No narrative on file       History reviewed. No pertinent family history.    REVIEW OF SYSTEMS:   12 point ROS was performed and is negative except as outlined in HPI          Objective:   Temp:  [97.9 °F (36.6 °C)-98.8 °F (37.1 °C)] 98 °F (36.7 °C)  Heart Rate:  [66-74] 66  Resp:  [16-18] 18  BP: (106-122)/(63-68) 106/64  Body mass index is 21.29 kg/m².  Flowsheet Rows    Flowsheet Row First Filed Value   Admission Height 172.7 cm (68\") Documented at 03/08/2018 1637   Admission Weight 63.5 kg (140 lb) Documented at 03/08/2018 1637        Vitals:    03/14/18 1445   BP: 106/64   Pulse: 66   Resp: 18   Temp: 98 °F (36.7 °C)   SpO2: 99%       General Appearance:    Alert, cooperative, in no acute distress   Head:    Normocephalic, without obvious abnormality, atraumatic   Eyes:            Lids and lashes normal, conjunctivae and sclerae normal, no   icterus, no pallor, corneas clear   Ears:    Ears appear intact with no abnormalities noted   Throat:   No oral lesions, no thrush, oral mucosa moist   Neck:   No adenopathy, supple, trachea midline, no thyromegaly, no   carotid bruit, no JVD   Back:     No kyphosis present, no scoliosis present, no skin lesions, erythema or scars, no tenderness, range of motion normal   Lungs:     Clear to auscultation,respirations regular, even and unlabored    Heart:    Regular rhythm and normal rate, normal S1 and S2, 3/6 LIZANDRO no gallop, no rub, no click   Chest Wall:    No abnormalities observed   Abdomen:     Normal bowel sounds, no masses, no organomegaly, soft        non-tender, non-distended, no guarding, no rebound  tenderness   Extremities:   Moves all extremities well, no edema, no cyanosis, no redness   Pulses: "   Pulses palpable and equal bilaterally. Normal radial, carotid,  dorsalis pedis and posterior tibial pulses bilaterally. B/l carotid bruits.    Skin:  Psychiatric:   No bleeding, bruising or rash    Alert and oriented x 3, normal mood and affect                 Lab Review:      Results from last 7 days  Lab Units 03/14/18  0457  03/12/18  0603   SODIUM mmol/L 139  < > 136   POTASSIUM mmol/L 3.7  < > 4.0   CHLORIDE mmol/L 100  < > 97*   CO2 mmol/L 26.6  < > 28.1   BUN mg/dL 12  < > 5*   CREATININE mg/dL 0.69*  < > 0.57*   CALCIUM mg/dL 8.3*  < > 8.5*   BILIRUBIN mg/dL  --   --  0.6   ALK PHOS U/L  --   --  57   ALT (SGPT) U/L  --   --  16   AST (SGOT) U/L  --   --  39   GLUCOSE mg/dL 225*  < > 212*   < > = values in this interval not displayed.    Results from last 7 days  Lab Units 03/10/18  0541 03/08/18 2013   TROPONIN T ng/mL 0.014 0.016         Results from last 7 days  Lab Units 03/14/18  0457 03/13/18  0432 03/12/18  0603   WBC 10*3/mm3 4.83 4.41* 4.97   HEMOGLOBIN g/dL 9.6* 9.9* 10.9*   HEMATOCRIT % 29.5* 29.8* 33.1*   PLATELETS 10*3/mm3 385 310 266           Results from last 7 days  Lab Units 03/10/18  0541   MAGNESIUM mg/dL 2.1       I personally viewed and interpreted the patient's EKG/Telemetry data  )  Patient Active Problem List   Diagnosis   • Diabetic ketoacidosis without coma associated with type 1 diabetes mellitus   • Type 1 diabetes mellitus   • Tobacco abuse   • Alcohol abuse   • Insurance coverage problems   • Bilateral carpal tunnel syndrome   • Microalbuminuria     Assessment and Plan:    1. DM, s/p DKA - treated per primary team  2. Severe AS - has symptoms consistent with poor cardiac output.   3. Alcohol abuse - advised cessation.  4. Tobacco use - advised cessation  5. Poor dentition.    Consult TAVR team, check carotid.  Will need oral surgery if proceed with AVR.     Spoke with Patient and his girlfriend.  I also called Virginia Edwards to get him consults for TAVR evaluation.  I  explained to him the risk of aortic stenosis as well as the risk and benefit of aortic valve replacement versus tavr.    Transfer to monitored bed.     Carri Menchaca MD  03/14/18  4:59 PM.  Time spent in reviewing chart, discussion and examination:

## 2018-04-21 ENCOUNTER — TRANSFERRED RECORDS (OUTPATIENT)
Dept: HEALTH INFORMATION MANAGEMENT | Facility: CLINIC | Age: 48
End: 2018-04-21

## 2018-04-25 ENCOUNTER — TRANSFERRED RECORDS (OUTPATIENT)
Dept: HEALTH INFORMATION MANAGEMENT | Facility: CLINIC | Age: 48
End: 2018-04-25

## 2018-05-22 ENCOUNTER — TRANSFERRED RECORDS (OUTPATIENT)
Dept: HEALTH INFORMATION MANAGEMENT | Facility: CLINIC | Age: 48
End: 2018-05-22

## 2018-06-01 ENCOUNTER — TRANSFERRED RECORDS (OUTPATIENT)
Dept: HEALTH INFORMATION MANAGEMENT | Facility: CLINIC | Age: 48
End: 2018-06-01

## 2018-06-20 ASSESSMENT — ENCOUNTER SYMPTOMS
MUSCLE CRAMPS: 0
DECREASED CONCENTRATION: 1
HYPOTENSION: 1
NIGHT SWEATS: 0
MYALGIAS: 1
BOWEL INCONTINENCE: 0
BLOOD IN STOOL: 0
NAUSEA: 1
LOSS OF CONSCIOUSNESS: 0
RECTAL PAIN: 0
NECK PAIN: 0
MEMORY LOSS: 0
PARALYSIS: 0
INCREASED ENERGY: 1
INSOMNIA: 0
NUMBNESS: 1
DIZZINESS: 1
DECREASED APPETITE: 1
SYNCOPE: 0
POLYPHAGIA: 0
WEIGHT LOSS: 0
SEIZURES: 0
LEG PAIN: 0
PANIC: 1
VOMITING: 0
STIFFNESS: 1
ABDOMINAL PAIN: 1
SPEECH CHANGE: 0
WEAKNESS: 1
BACK PAIN: 1
WEIGHT GAIN: 0
CHILLS: 0
HYPERTENSION: 0
DIARRHEA: 0
LIGHT-HEADEDNESS: 0
POLYDIPSIA: 0
NERVOUS/ANXIOUS: 1
DEPRESSION: 1
BLOATING: 1
MUSCLE WEAKNESS: 1
JOINT SWELLING: 1
FATIGUE: 1
DISTURBANCES IN COORDINATION: 0
SLEEP DISTURBANCES DUE TO BREATHING: 0
JAUNDICE: 0
TREMORS: 0
FEVER: 1
ARTHRALGIAS: 1
HEADACHES: 1
ALTERED TEMPERATURE REGULATION: 1
CONSTIPATION: 1
PALPITATIONS: 1
EXERCISE INTOLERANCE: 1
TINGLING: 1
HEARTBURN: 0
ORTHOPNEA: 0

## 2018-06-21 ENCOUNTER — RADIANT APPOINTMENT (OUTPATIENT)
Dept: CT IMAGING | Facility: CLINIC | Age: 48
End: 2018-06-21
Attending: CLINICAL NURSE SPECIALIST
Payer: MEDICARE

## 2018-06-21 ENCOUNTER — OFFICE VISIT (OUTPATIENT)
Dept: SURGERY | Facility: CLINIC | Age: 48
End: 2018-06-21
Attending: STUDENT IN AN ORGANIZED HEALTH CARE EDUCATION/TRAINING PROGRAM
Payer: MEDICARE

## 2018-06-21 VITALS
OXYGEN SATURATION: 96 % | DIASTOLIC BLOOD PRESSURE: 67 MMHG | TEMPERATURE: 97.2 F | SYSTOLIC BLOOD PRESSURE: 103 MMHG | RESPIRATION RATE: 16 BRPM | HEART RATE: 73 BPM | BODY MASS INDEX: 28.36 KG/M2 | WEIGHT: 187.1 LBS | HEIGHT: 68 IN

## 2018-06-21 DIAGNOSIS — K44.9 PARAESOPHAGEAL HERNIA: ICD-10-CM

## 2018-06-21 DIAGNOSIS — K44.9 PARAESOPHAGEAL HERNIA: Primary | ICD-10-CM

## 2018-06-21 PROCEDURE — G0463 HOSPITAL OUTPT CLINIC VISIT: HCPCS | Mod: ZF

## 2018-06-21 RX ORDER — LORAZEPAM 0.5 MG/1
TABLET ORAL
COMMUNITY
Start: 2018-05-15

## 2018-06-21 ASSESSMENT — PAIN SCALES - GENERAL: PAINLEVEL: MODERATE PAIN (5)

## 2018-06-21 NOTE — Clinical Note
Hey,  She needs a chest CT today please. She also needs to have a gastric empty study scheduled (can be on a different day). She is in room 28 waiting to schedule.  Thanks H

## 2018-06-21 NOTE — LETTER
6/21/2018       RE: Loli Pearson  [address] [place] [id number]     Dear Colleague,    Thank you for referring your patient, Loli Pearson, to the [facility]. Please see a copy of my visit note below.    THORACIC SURGERY FOLLOW UP VISIT    Dear Dr. Cheng,  I saw Ms. Loli Pearson in follow-up today. The clinical summary follows:     PREOP DIAGNOSIS   Paraesophageal hernia  PROCEDURE   Lap repair of paraesophageal hernia and Nissen fundoplication (3/13/17)    COMPLICATIONS  Crampy abdominal pain for a few months after surgery    INTERVAL STUDIES  CTA Coronary (6/1/18): no evidence of CAD, ? hiatal hernia. Images not available at this time.    BMI 28    SUBJECTIVE  Ms. Pearson was last seen in the summer of 2017. At that time, she cancelled a planned EGD to evaluate her crampy abdominal pain saying that her symptoms were improving. Two months ago she experienced chest pain prompting a cardiac workup. The workup was negative for CAD, but her CTA Coronary described a hiatal hernia vs expected post operative changes.    She returns to clinic today with a complaint of chest and abdominal pain. The chest pain occurs without any particular inciting event. The abdominal pain occurs with eating and ambulating. She does have some associated nausea and belching, but no vomiting or regurgitation. She denies dysphagia.    From a personal perspective, she is here today by herself.     IMPRESSION (K44.9) Paraesophageal hernia  (primary encounter diagnosis)      47 year-old female with abdominal pain s/p laparoscopic hiatal hernia repair and Nissen Fundoplication    PLAN  I spent a total of 40 minutes with Ms. Loli Pearson, more than 50% of which were spent in counseling, coordination of care, and face-to-face time. I reviewed the plan as follows:  The etiology of Ms. Pearson's pain is not clear. She may have a recurrent hiatal hernia, or the reported hiatal hernia may simply be normal  postoperative changes. We will obtain a CT scan today to further evaluate her abdominal pain.   I also discussed with her that the pain could potentially be due to delayed gastric emptying given her long standing fullness and bloating symptoms.  I also suggested to her that chronic use of pain medications could potentially affect her GI system. This suggestion upset her to a great extent as she feels that her symptoms should not be attributed to her chronic pain.  I have reassured her that we are going to thoroughly investigate all the potential causes for her persistent symptoms. However, the chronic pain issue cannot be completely ignored as it might have a contributory role.    Necessary Tests & Appointments:   CT Chest/Abdomen today  Gastric emptying study  EGD if above tests yield no definite answers  All questions were answered and the patient and present family were in agreement with the plan.  I appreciate the opportunity to participate in the care of your patient and will keep you updated.  Sincerely,        Cheryl Schilling MD

## 2018-06-21 NOTE — PROGRESS NOTES
THORACIC SURGERY FOLLOW UP VISIT    Dear Dr. Cheng,  I saw Ms. Loli Pearson in follow-up today. The clinical summary follows:     PREOP DIAGNOSIS   Paraesophageal hernia  PROCEDURE   Lap repair of paraesophageal hernia and Nissen fundoplication (3/13/17)    COMPLICATIONS  Crampy abdominal pain for a few months after surgery    INTERVAL STUDIES  CTA Coronary (6/1/18): no evidence of CAD, ? hiatal hernia. Images not available at this time.    BMI 28    SUBJECTIVE  Ms. Pearson was last seen in the summer of 2017. At that time, she cancelled a planned EGD to evaluate her crampy abdominal pain saying that her symptoms were improving. Two months ago she experienced chest pain prompting a cardiac workup. The workup was negative for CAD, but her CTA Coronary described a hiatal hernia vs expected post operative changes.    She returns to clinic today with a complaint of chest and abdominal pain. The chest pain occurs without any particular inciting event. The abdominal pain occurs with eating and ambulating. She does have some associated nausea and belching, but no vomiting or regurgitation. She denies dysphagia.    From a personal perspective, she is here today by herself.     IMPRESSION (K44.9) Paraesophageal hernia  (primary encounter diagnosis)      47 year-old female with abdominal pain s/p laparoscopic hiatal hernia repair and Nissen Fundoplication    PLAN  I spent a total of 40 minutes with Ms. Loli Pearson, more than 50% of which were spent in counseling, coordination of care, and face-to-face time. I reviewed the plan as follows:  The etiology of Ms. Pearson's pain is not clear. She may have a recurrent hiatal hernia, or the reported hiatal hernia may simply be normal postoperative changes. We will obtain a CT scan today to further evaluate her abdominal pain.   I also discussed with her that the pain could potentially be due to delayed gastric emptying given her long standing fullness and bloating  symptoms.  I also suggested to her that chronic use of pain medications could potentially affect her GI system. This suggestion upset her to a great extent as she feels that her symptoms should not be attributed to her chronic pain.  I have reassured her that we are going to thoroughly investigate all the potential causes for her persistent symptoms. However, the chronic pain issue cannot be completely ignored as it might have a contributory role.    Necessary Tests & Appointments:   CT Chest/Abdomen today  Gastric emptying study  EGD if above tests yield no definite answers  All questions were answered and the patient and present family were in agreement with the plan.  I appreciate the opportunity to participate in the care of your patient and will keep you updated.  Sincerely,

## 2018-06-21 NOTE — MR AVS SNAPSHOT
After Visit Summary   6/21/2018    Loli Pearson    MRN: [id number]           Patient Information     Date Of Birth          1970        Visit Information        Provider Department      6/21/2018 5:30 PM Cheryl Schilling MD [facility]        Today's Diagnoses     Paraesophageal hernia    -  1       Follow-ups after your visit        Future tests that were ordered for you today     Open Future Orders        Priority Expected Expires Ordered    NM Gastric Emptying Routine  6/21/2019 6/21/2018            Who to contact     If you have questions or need follow up information about today's clinic visit or your schedule please contact [facility] directly at [phone].  Normal or non-critical lab and imaging results will be communicated to you by MyChart, letter or phone within 4 business days after the clinic has received the results. If you do not hear from us within 7 days, please contact the clinic through [employer] or phone. If you have a critical or abnormal lab result, we will notify you by phone as soon as possible.  Submit refill requests through [employer] or call your pharmacy and they will forward the refill request to us. Please allow 3 business days for your refill to be completed.          Additional Information About Your Visit        MyChart Information     [employer] gives you secure access to your electronic health record. If you see a primary care provider, you can also send messages to your care team and make appointments. If you have questions, please call your primary care clinic.  If you do not have a primary care provider, please call [phone] and they will assist you.        Care EveryWhere ID     This is your Care EveryWhere ID. This could be used by other organizations to access your [place] medical records  [id number]        Your Vitals Were     Pulse Temperature Respirations Height Pulse Oximetry BMI (Body Mass Index)    73 97.2  " F (36.2  C) (Oral) 16 1.727 m (5' 7.99\") 96% 28.46 kg/m2       Blood Pressure from Last 3 Encounters:   06/21/18 103/67   07/13/17 119/80   04/06/17 (P) 106/73    Weight from Last 3 Encounters:   06/21/18 84.9 kg (187 lb 1.6 oz)   07/13/17 84.7 kg (186 lb 12.8 oz)   04/06/17 (P) 88.4 kg (194 lb 12.8 oz)               Primary Care Provider Office Phone # Fax #    Noemy Rizo, [phone]       [facility] [address] [facility] [address]        Equal Access to Services     YULIA MENDES : Jose martínez Sopretty, waaxda luqadaha, qaybta kaalmada ademaydayajasiel, darlene musa. So [facility] [phone].    ATENCIÓN: Si habla español, tiene a aldana disposición servicios gratuitos de asistencia lingüística. Llame al [phone].    We comply with applicable federal civil rights laws and [place] laws. We do not discriminate on the basis of race, color, national origin, age, disability, sex, sexual orientation, or gender identity.            Thank you!     Thank you for choosing [facility]  for your care. Our goal is always to provide you with excellent care. Hearing back from our patients is one way we can continue to improve our services. Please take a few minutes to complete the written survey that you may receive in the mail after your visit with us. Thank you!             Your Updated Medication List - Protect others around you: Learn how to safely use, store and throw away your medicines at www.disposemymeds.org.          This list is accurate as of 6/21/18 11:40 PM.  Always use your most recent med list.                   Brand Name Dispense Instructions for use Diagnosis    acetaminophen 32 mg/mL solution    TYLENOL    500 mL    Take 30.45 mLs (975 mg) by mouth every 8 hours    Acute post-operative pain       buPROPion 150 MG 12 hr tablet    WELLBUTRIN SR          DULoxetine 30 MG EC capsule    CYMBALTA          HYDROCHLOROTHIAZIDE PO    "   Take 12.5 mg by mouth daily        LORazepam 0.5 MG tablet    ATIVAN          NORTRIPTYLINE HCL PO      Take 25 mg by mouth At Bedtime        oxyCODONE 5 MG/5ML solution    ROXICODONE    350 mL    Take 5-10 mLs (5-10 mg) by mouth every 4 hours as needed for moderate to severe pain    Acute post-operative pain       polyethylene glycol powder    MIRALAX    500 g    Take 17 g (1 capful) by mouth daily    Adynamic ileus (H)       PRAMIPEXOLE DIHYDROCHLORIDE PO      Take 0.25 mg by mouth daily        propranolol 60 MG 24 hr capsule    INDERAL LA          sennosides 8.6 MG tablet    SENOKOT    120 each    Take 1-2 tablets by mouth 2 times daily Crush pill.    Adynamic ileus (H)       traZODone 50 MG tablet    DESYREL          ZOLPIDEM TARTRATE PO      Take 5 mg by mouth At Bedtime

## 2018-06-21 NOTE — NURSING NOTE
"Oncology Rooming Note    June 21, 2018 5:12 PM   Loli Pearson is a 47 year old female who presents for:    Chief Complaint   Patient presents with     RECHECK      Return Hiatal Hernia     Initial Vitals: /67 (BP Location: Left arm, Patient Position: Sitting, Cuff Size: Adult Regular)  Pulse 73  Temp 97.2  F (36.2  C) (Oral)  Resp 16  Ht 1.727 m (5' 7.99\")  Wt 84.9 kg (187 lb 1.6 oz)  SpO2 96%  BMI 28.46 kg/m2 Estimated body mass index is 28.46 kg/(m^2) as calculated from the following:    Height as of this encounter: 1.727 m (5' 7.99\").    Weight as of this encounter: 84.9 kg (187 lb 1.6 oz). Body surface area is 2.02 meters squared.  Moderate Pain (5) Comment: Stomach pain   No LMP recorded. Patient has had a hysterectomy.  Allergies reviewed: Yes  Medications reviewed: Yes    Medications: Medication refills not needed today.  Pharmacy name entered into [facility]: [facility] [address]    Clinical concerns: none    8 minutes for nursing intake (face to face time)     Mindi MELO Valerio              "

## 2018-06-22 ENCOUNTER — TELEPHONE (OUTPATIENT)
Dept: SURGERY | Facility: CLINIC | Age: 48
End: 2018-06-22

## 2018-06-22 NOTE — TELEPHONE ENCOUNTER
Call to Chacha in Medical Records at [facility] regarding: we need images pushed to our PACs from her June 1 CTA and we need the report for the CT she had done on 3/1/2017.    There was no answer. Left a detailed message on Chacha's voicemail and included my call back number if she has questions.

## 2018-07-06 ENCOUNTER — HOSPITAL ENCOUNTER (OUTPATIENT)
Dept: NUCLEAR MEDICINE | Facility: CLINIC | Age: 48
Setting detail: NUCLEAR MEDICINE
Discharge: HOME | End: 2018-07-06
Attending: CLINICAL NURSE SPECIALIST | Admitting: CLINICAL NURSE SPECIALIST
Payer: MEDICARE

## 2018-07-06 DIAGNOSIS — K44.9 PARAESOPHAGEAL HERNIA: ICD-10-CM

## 2018-07-06 PROCEDURE — 78264 GASTRIC EMPTYING IMG STUDY: CPT

## 2018-07-06 PROCEDURE — A9541 TC99M SULFUR COLLOID: HCPCS | Performed by: CLINICAL NURSE SPECIALIST

## 2018-07-06 PROCEDURE — 34300033 ZZH RX 343: Performed by: CLINICAL NURSE SPECIALIST

## 2018-07-06 RX ADMIN — Medication 2 MILLICURIE: at 08:35

## 2018-07-13 ENCOUNTER — TELEPHONE (OUTPATIENT)
Dept: SURGERY | Facility: CLINIC | Age: 48
End: 2018-07-13

## 2018-07-13 DIAGNOSIS — K44.9 HIATAL HERNIA: Primary | ICD-10-CM

## 2018-07-13 NOTE — TELEPHONE ENCOUNTER
Call to Loli to let her know that Dr. Schilling has reviewed her tests. Her gastric empty study is normal and the CT does show a partial herniation.     Dr. Schilling would like for Loli to get an EGD for better visualization of the esophagus. Loli is in agreement with this and will await a call with appointment information.

## 2018-08-03 ENCOUNTER — TELEPHONE (OUTPATIENT)
Dept: GASTROENTEROLOGY | Facility: CLINIC | Age: 48
End: 2018-08-03

## 2018-08-06 ENCOUNTER — TELEPHONE (OUTPATIENT)
Dept: GASTROENTEROLOGY | Facility: CLINIC | Age: 48
End: 2018-08-06

## 2018-08-06 NOTE — TELEPHONE ENCOUNTER
Patient scheduled for EGD     Indication for procedure. Hiatal Hernia     Referring Provider. Kaci Jaime APRN CNS    ? No     Arrival time verified? Patient to arrive at 745 am     Facility location verified? [address] [place] [address], 5th floor     Instructions given regarding prep and procedure. Transportation policy reviewed and verbalized understanding.     Prep Type NPO     Are you taking any anticoagulants or blood thinners? Denies     Instructions given? Yes     Electronic implanted devices? Denies     Pre procedure teaching completed? Yes    Transportation from procedure? Yes     H&P / Pre op physical completed? N/A    Yair Santoro RN

## 2018-08-10 ENCOUNTER — SURGERY (OUTPATIENT)
Age: 48
End: 2018-08-10

## 2018-08-10 ENCOUNTER — HOSPITAL ENCOUNTER (OUTPATIENT)
Facility: AMBULATORY SURGERY CENTER | Age: 48
End: 2018-08-10
Attending: INTERNAL MEDICINE
Payer: MEDICARE

## 2018-08-10 VITALS
OXYGEN SATURATION: 96 % | HEART RATE: 65 BPM | TEMPERATURE: 98.8 F | BODY MASS INDEX: 27.28 KG/M2 | WEIGHT: 180 LBS | SYSTOLIC BLOOD PRESSURE: 120 MMHG | RESPIRATION RATE: 18 BRPM | HEIGHT: 68 IN | DIASTOLIC BLOOD PRESSURE: 65 MMHG

## 2018-08-10 LAB — UPPER GI ENDOSCOPY: NORMAL

## 2018-08-10 RX ORDER — FLUMAZENIL 0.1 MG/ML
0.2 INJECTION, SOLUTION INTRAVENOUS
Status: ACTIVE | OUTPATIENT
Start: 2018-08-10 | End: 2018-08-10

## 2018-08-10 RX ORDER — ONDANSETRON 2 MG/ML
4 INJECTION INTRAMUSCULAR; INTRAVENOUS
Status: DISCONTINUED | OUTPATIENT
Start: 2018-08-10 | End: 2018-08-10 | Stop reason: HOSPADM

## 2018-08-10 RX ORDER — ONDANSETRON 4 MG/1
4 TABLET, ORALLY DISINTEGRATING ORAL EVERY 6 HOURS PRN
Status: DISCONTINUED | OUTPATIENT
Start: 2018-08-10 | End: 2018-08-11 | Stop reason: HOSPADM

## 2018-08-10 RX ORDER — ONDANSETRON 2 MG/ML
4 INJECTION INTRAMUSCULAR; INTRAVENOUS EVERY 6 HOURS PRN
Status: DISCONTINUED | OUTPATIENT
Start: 2018-08-10 | End: 2018-08-11 | Stop reason: HOSPADM

## 2018-08-10 RX ORDER — FENTANYL CITRATE 50 UG/ML
INJECTION, SOLUTION INTRAMUSCULAR; INTRAVENOUS PRN
Status: DISCONTINUED | OUTPATIENT
Start: 2018-08-10 | End: 2018-08-10 | Stop reason: HOSPADM

## 2018-08-10 RX ORDER — NALOXONE HYDROCHLORIDE 0.4 MG/ML
.1-.4 INJECTION, SOLUTION INTRAMUSCULAR; INTRAVENOUS; SUBCUTANEOUS
Status: DISCONTINUED | OUTPATIENT
Start: 2018-08-10 | End: 2018-08-11 | Stop reason: HOSPADM

## 2018-08-10 RX ORDER — LIDOCAINE 40 MG/G
CREAM TOPICAL
Status: DISCONTINUED | OUTPATIENT
Start: 2018-08-10 | End: 2018-08-10 | Stop reason: HOSPADM

## 2018-08-10 RX ADMIN — FENTANYL CITRATE 50 MCG: 50 INJECTION, SOLUTION INTRAMUSCULAR; INTRAVENOUS at 08:54

## 2018-08-10 RX ADMIN — FENTANYL CITRATE 100 MCG: 50 INJECTION, SOLUTION INTRAMUSCULAR; INTRAVENOUS at 08:48

## 2018-08-10 NOTE — OR NURSING
Pt tolerated EGD well. 2 episodes of sinus tach, 125-130, for less than 10 seconds each, when scope was being introduced and removed.SR now

## 2018-08-10 NOTE — DISCHARGE INSTRUCTIONS
Discharge Instructions after Upper Endoscopy (EGD)       Activity and Diet   You were given medicine for pain. You may be dizzy or sleepy.   For 24 hours:     Do not drive or use heavy equipment.     Do not make important decisions.     Do not drink any alcohol.   ___ You may return to your regular diet.       Discomfort   You may have a sore throat for 2 to 3 days. It may help to:     Avoid hot liquids for 24 hours.     Use sore throat lozenges.     Gargle as needed with salt water up to 4 times a day. Mix 1 cup of warm water with 1 teaspoon of salt. Do not swallow.   ___ Your esophagus was dilated (opened) or banded during the exam:     Drink only cool liquids for the rest of the day. Eat a soft diet for the next few days.     You may have a sore chest for 2 to 3 days.       You may take Tylenol (acetaminophen) for pain unless your doctor has told you not to.       Do not take aspirin or ibuprofen (Advil, Motrin) or other NSAIDS (anti-inflammatory drugs) for ___ days.       Follow-up   ___ We took small tissue samples for study. If you do not have a follow-up visit scheduled, call your provider s office in 2 weeks for the results.       Other instructions________________________________________________________       When to call us:   Problems are rare. Call right away if you have:     Unusual throat pain or trouble swallowing     Unusual pain in belly or chest that is not relieved by belching or passing air     Black stools (tar-like looking bowel movement)     Temperature above 100.6  F. (37.5  C).       If you vomit blood or have severe pain, go to an emergency room.       If you have questions, call:   Monday to Friday, 7 a.m. to 4:30 p.m.: Endoscopy: [phone] (We may have to call you back)       After hours: Hospital: [phone] (Ask for the GI fellow on call)

## 2018-08-10 NOTE — IP AVS SNAPSHOT
[facility]    [address] [place] [address]    Phone:  [phone]    Fax:  [phone]                                       After Visit Summary   8/10/2018    Loli Pearson    MRN: [id number]           After Visit Summary Signature Page     I have received my discharge instructions, and my questions have been answered. I have discussed any challenges I see with this plan with the nurse or doctor.    ..........................................................................................................................................  Patient/Patient Representative Signature      ..........................................................................................................................................  Patient Representative Print Name and Relationship to Patient    ..................................................               ................................................  Date                                            Time    ..........................................................................................................................................  Reviewed by Signature/Title    ...................................................              ..............................................  Date                                                            Time

## 2018-08-10 NOTE — IP AVS SNAPSHOT
MRN:[id number]                      After Visit Summary   8/10/2018    Loli Pearson    MRN: [id number]           Thank you!     Thank you for choosing [facility] for your care. Our goal is always to provide you with excellent care. Hearing back from our patients is one way we can continue to improve our services. Please take a few minutes to complete the written survey that you may receive in the mail after you visit with us. Thank you!        Patient Information     Date Of Birth          1970        About your hospital stay     You were admitted on:  August 10, 2018 [facility]    You were discharged on:  August 10, 2018       Who to Call     For medical emergencies, please call 911.  For non-urgent questions about your medical care, please call your primary care provider or clinic, [phone]  For questions related to your surgery, please call your surgery clinic        Attending Provider     Provider Specialty    Leonid Penn MD Gastroenterology       Primary Care Provider Office Phone # Fax #    Noemy RizoALEKSANDAR [phone]      Further instructions from your care team       Discharge Instructions after Upper Endoscopy (EGD)       Activity and Diet   You were given medicine for pain. You may be dizzy or sleepy.   For 24 hours:     Do not drive or use heavy equipment.     Do not make important decisions.     Do not drink any alcohol.   ___ You may return to your regular diet.       Discomfort   You may have a sore throat for 2 to 3 days. It may help to:     Avoid hot liquids for 24 hours.     Use sore throat lozenges.     Gargle as needed with salt water up to 4 times a day. Mix 1 cup of warm water with 1 teaspoon of salt. Do not swallow.   ___ Your esophagus was dilated (opened) or banded during the exam:     Drink only cool liquids for the rest of the day. Eat a soft diet for the next few days.  "    You may have a sore chest for 2 to 3 days.       You may take Tylenol (acetaminophen) for pain unless your doctor has told you not to.       Do not take aspirin or ibuprofen (Advil, Motrin) or other NSAIDS (anti-inflammatory drugs) for ___ days.       Follow-up   ___ We took small tissue samples for study. If you do not have a follow-up visit scheduled, call your provider s office in 2 weeks for the results.       Other instructions________________________________________________________       When to call us:   Problems are rare. Call right away if you have:     Unusual throat pain or trouble swallowing     Unusual pain in belly or chest that is not relieved by belching or passing air     Black stools (tar-like looking bowel movement)     Temperature above 100.6  F. (37.5  C).       If you vomit blood or have severe pain, go to an emergency room.       If you have questions, call:   Monday to Friday, 7 a.m. to 4:30 p.m.: Endoscopy: [phone] (We may have to call you back)       After hours: Hospital: [phone] (Ask for the GI fellow on call)     Pending Results     No orders found from 8/8/2018 to 8/11/2018.            Admission Information     Date & Time Provider Department Dept. Phone    8/10/2018 Leonid Penn MD [facility] [phone]      Your Vitals Were     Blood Pressure Pulse Temperature Respirations Height Weight    128/89 65 99.2  F (37.3  C) (Oral) 21 1.727 m (5' 8\") 81.6 kg (180 lb)    Pulse Oximetry BMI (Body Mass Index)                95% 27.37 kg/m2          [employer] Information     [employer] gives you secure access to your electronic health record. If you see a primary care provider, you can also send messages to your care team and make appointments. If you have questions, please call your primary care clinic.  If you do not have a primary care provider, please call [phone] and they will assist you.      [employer] is an electronic gateway that " provides easy, online access to your medical records. With [employer], you can request a clinic appointment, read your test results, renew a prescription or communicate with your care team.     To access your existing account, please contact your [facility] or call [phone] for assistance.        Care EveryWhere ID     This is your Care EveryWhere ID. This could be used by other organizations to access your [place] medical records  [id number]        Equal Access to Services     YULIA MENDES : Hadii marty jenkins hadasho Soomaali, waaxda luqadaha, qaybta kaalmada adeegyada, darlene arteaga . So [facility] [phone].    ATENCIÓN: Si habla español, tiene a aldana disposición servicios gratuitos de asistencia lingüística. Llame al [phone].    We comply with applicable federal civil rights laws and [place] laws. We do not discriminate on the basis of race, color, national origin, age, disability, sex, sexual orientation, or gender identity.               Review of your medicines      UNREVIEWED medicines. Ask your doctor about these medicines        Dose / Directions    acetaminophen 32 mg/mL solution   Commonly known as:  TYLENOL   Used for:  Acute post-operative pain        Dose:  975 mg   Take 30.45 mLs (975 mg) by mouth every 8 hours   Quantity:  500 mL   Refills:  0       buPROPion 150 MG 12 hr tablet   Commonly known as:  WELLBUTRIN SR        Refills:  0       DULoxetine 30 MG EC capsule   Commonly known as:  CYMBALTA        Refills:  0       HYDROCHLOROTHIAZIDE PO   Indication:  take half tablet daily        Dose:  12.5 mg   Take 12.5 mg by mouth daily   Refills:  0       IBUPROFEN PO        Dose:  200 mg   Take 200 mg by mouth   Refills:  0       LORazepam 0.5 MG tablet   Commonly known as:  ATIVAN        Refills:  0       NORTRIPTYLINE HCL PO        Dose:  25 mg   Take 25 mg by mouth At Bedtime   Refills:  0       oxyCODONE 5 MG/5ML solution   Commonly known  as:  ROXICODONE   Used for:  Acute post-operative pain        Dose:  5-10 mg   Take 5-10 mLs (5-10 mg) by mouth every 4 hours as needed for moderate to severe pain   Quantity:  350 mL   Refills:  0       polyethylene glycol powder   Commonly known as:  MIRALAX   Used for:  Adynamic ileus (H)        Dose:  1 capful   Take 17 g (1 capful) by mouth daily   Quantity:  500 g   Refills:  1       PRAMIPEXOLE DIHYDROCHLORIDE PO        Dose:  0.25 mg   Take 0.25 mg by mouth daily   Refills:  0       propranolol 60 MG 24 hr capsule   Commonly known as:  INDERAL LA        Refills:  0       sennosides 8.6 MG tablet   Commonly known as:  SENOKOT   Used for:  Adynamic ileus (H)        Dose:  1-2 tablet   Take 1-2 tablets by mouth 2 times daily Crush pill.   Quantity:  120 each   Refills:  1       traZODone 50 MG tablet   Commonly known as:  DESYREL        Refills:  0       ZOLPIDEM TARTRATE PO        Dose:  5 mg   Take 5 mg by mouth At Bedtime   Refills:  0                Protect others around you: Learn how to safely use, store and throw away your medicines at www.disposemymeds.org.             Medication List: This is a list of all your medications and when to take them. Check marks below indicate your daily home schedule. Keep this list as a reference.      Medications           Morning Afternoon Evening Bedtime As Needed    acetaminophen 32 mg/mL solution   Commonly known as:  TYLENOL   Take 30.45 mLs (975 mg) by mouth every 8 hours                                buPROPion 150 MG 12 hr tablet   Commonly known as:  WELLBUTRIN SR                                DULoxetine 30 MG EC capsule   Commonly known as:  CYMBALTA                                HYDROCHLOROTHIAZIDE PO   Take 12.5 mg by mouth daily                                IBUPROFEN PO   Take 200 mg by mouth                                LORazepam 0.5 MG tablet   Commonly known as:  ATIVAN                                NORTRIPTYLINE HCL PO   Take 25 mg by mouth At  Bedtime                                oxyCODONE 5 MG/5ML solution   Commonly known as:  ROXICODONE   Take 5-10 mLs (5-10 mg) by mouth every 4 hours as needed for moderate to severe pain                                polyethylene glycol powder   Commonly known as:  MIRALAX   Take 17 g (1 capful) by mouth daily                                PRAMIPEXOLE DIHYDROCHLORIDE PO   Take 0.25 mg by mouth daily                                propranolol 60 MG 24 hr capsule   Commonly known as:  INDERAL LA                                sennosides 8.6 MG tablet   Commonly known as:  SENOKOT   Take 1-2 tablets by mouth 2 times daily Crush pill.                                traZODone 50 MG tablet   Commonly known as:  DESYREL                                ZOLPIDEM TARTRATE PO   Take 5 mg by mouth At Bedtime

## 2018-08-17 ENCOUNTER — TELEPHONE (OUTPATIENT)
Dept: SURGERY | Facility: CLINIC | Age: 48
End: 2018-08-17

## 2018-08-17 NOTE — TELEPHONE ENCOUNTER
Call to Loli to let her know that Dr. Schilling and Dr. Griffin have reviewed her images and EGD and after a conference, we feel she would likely benefit from surgery to repair the hernia.    There was no answer. Message left with call back information.    Loli returned my call and I told her the above. She is in agreement and will await a call with appointment information.

## 2018-08-30 ASSESSMENT — ENCOUNTER SYMPTOMS
POLYPHAGIA: 0
DISTURBANCES IN COORDINATION: 0
WHEEZING: 0
HEMOPTYSIS: 0
ORTHOPNEA: 0
BLOATING: 1
SINUS CONGESTION: 0
INCREASED ENERGY: 1
FLANK PAIN: 1
COUGH DISTURBING SLEEP: 0
NUMBNESS: 1
HEMATURIA: 0
SPEECH CHANGE: 0
HYPERTENSION: 0
LIGHT-HEADEDNESS: 1
ARTHRALGIAS: 1
SMELL DISTURBANCE: 0
NERVOUS/ANXIOUS: 1
TASTE DISTURBANCE: 0
TINGLING: 1
FATIGUE: 1
WEIGHT GAIN: 0
NECK PAIN: 0
LEG PAIN: 1
DIFFICULTY URINATING: 0
SNORES LOUDLY: 0
NECK MASS: 0
NIGHT SWEATS: 0
PALPITATIONS: 1
HOARSE VOICE: 0
HEADACHES: 1
SLEEP DISTURBANCES DUE TO BREATHING: 0
SINUS PAIN: 0
SORE THROAT: 0
PARALYSIS: 0
MUSCLE WEAKNESS: 1
DIZZINESS: 1
TREMORS: 0
NAUSEA: 1
JAUNDICE: 0
SPUTUM PRODUCTION: 0
RECTAL PAIN: 0
CONSTIPATION: 1
WEAKNESS: 1
CHILLS: 1
STIFFNESS: 1
VOMITING: 0
DYSURIA: 1
JOINT SWELLING: 0
HALLUCINATIONS: 0
POSTURAL DYSPNEA: 0
WEIGHT LOSS: 0
ABDOMINAL PAIN: 1
POLYDIPSIA: 0
ALTERED TEMPERATURE REGULATION: 1
INSOMNIA: 1
DIARRHEA: 0
MYALGIAS: 1
PANIC: 1
BLOOD IN STOOL: 0
SHORTNESS OF BREATH: 1
DYSPNEA ON EXERTION: 1
DEPRESSION: 1
DECREASED APPETITE: 0
SYNCOPE: 0
MEMORY LOSS: 0
BACK PAIN: 1
FEVER: 1
BOWEL INCONTINENCE: 0
TROUBLE SWALLOWING: 0
COUGH: 0
EXERCISE INTOLERANCE: 1
HYPOTENSION: 1
LOSS OF CONSCIOUSNESS: 0
MUSCLE CRAMPS: 0
HEARTBURN: 1
SEIZURES: 0
DECREASED CONCENTRATION: 1

## 2018-09-04 NOTE — PROGRESS NOTES
THORACIC SURGERY FOLLOW UP VISIT    Dear Dr. Cheng,  I saw Ms. Loli Pearson in follow-up today. The clinical summary follows:     PREOP DIAGNOSIS   Paraesophageal hernia  PROCEDURE   Lap repair of paraesophageal hernia and Nissen fundoplication (03/13/2017)  COMPLICATIONS  Persistent cramping abdominal pain after meals that began a few months after surgery    INTERVAL STUDIES  CT chest and abdomen 6/21/2018- Postoperative changes of Nissen fundoplication. Portion of wrap extends through diaphragmatic hiatus concerning for recurrent hiatal hernia.        NM gastric emptying study 7/6/2018- Normal gastric emptying.    Upper endoscopy 8/10/2018- Normal esophagus, paraesophageal hernia, normal duodenum.      ETOH: Rare  TOB: Never  BMI: 28     SUBJECTIVE  Ms. Pearson complains of LUQ abdominal pain that begins shortly after meals. States that this began a few months after her operation. Has tried small frequent meals without significant improvement. Some nausea and bloating as well, but describes the post prandial pain is her biggest complaint. Denies difficulty swallowing or dysphagia. She also complains of constipation requiring bowel regimen. Feels fatigued frequently, denies recent weight loss.     From a personal perspective, she is here with her . She is not employed.     IMPRESSION(K44.9) Paraesophageal hernia  (primary encounter diagnosis)    47 year-old female with abdominal pain s/p laparoscopic hiatal hernia repair and Nissen Fundoplication, work up concerning for small recurrent hiatal hernia.    PLAN  I spent a total of 45 minutes with Mrs. Loli Pearson and her , more than 50% of which were spent in counseling, coordination of care, and face-to-face time. I reviewed the plan as follows:    Procedure planned: Redo laparoscopic hiatal hernia repair with Nissen take-down and redo Nissen fundoplication, PEG tube, possible open.   I reviewed the indications, risks, and benefits of the  procedure with Ms. Loli Pearson.  We discussed the risks that include but are not limited to bleeding, infection, need for another reoperation, conversion to laparotomy, potential long-term failure, and death. I explained the anticipated hospital course (5-7 days) and postoperative recovery, transient dysphagia, transient diarrhea, and permanent postprandial bloating which can be mitigated with proper dietary habits. We discussed the importance of lifetime awareness to limit lifting heavy weights in case of identifying a hiatal hernia.  We discussed that her symptoms are atypical and that there was about a chance that some of her pain may still persist after the surgery. However, given that there was recurrent hernia identified and that she had GI symptoms taht could be attributable to it, considering surgery was reasonable. She wishes to proceed.  We also discussed that some of her symptoms could be due to chronic constipation and it was impotant for her to get this under control.    Consent: pending  Necessary Preop Tests & Appointments:     Outpatient GI consultation for constipation management      PAC    Regional Anesthesia Plan: local anesthesia at port sites  Anticoagulation Plan: s/c heparin post op  Smoking Cessation: n/a    All questions were answered and Loli Pearson and present family were in agreement with the plan.  I appreciate the opportunity to participate in the care of your patient and will keep you updated.  Sincerely,  All questions were answered and the patient and present family were in agreement with the plan.  I appreciate the opportunity to participate in the care of your patient and will keep you updated.  Sincerely,

## 2018-09-06 ENCOUNTER — OFFICE VISIT (OUTPATIENT)
Dept: SURGERY | Facility: CLINIC | Age: 48
End: 2018-09-06
Attending: STUDENT IN AN ORGANIZED HEALTH CARE EDUCATION/TRAINING PROGRAM
Payer: MEDICARE

## 2018-09-06 VITALS
WEIGHT: 185.31 LBS | BODY MASS INDEX: 28.09 KG/M2 | SYSTOLIC BLOOD PRESSURE: 103 MMHG | OXYGEN SATURATION: 99 % | HEART RATE: 66 BPM | HEIGHT: 68 IN | TEMPERATURE: 99 F | RESPIRATION RATE: 16 BRPM | DIASTOLIC BLOOD PRESSURE: 69 MMHG

## 2018-09-06 DIAGNOSIS — K44.9 HIATAL HERNIA: Primary | ICD-10-CM

## 2018-09-06 PROCEDURE — G0463 HOSPITAL OUTPT CLINIC VISIT: HCPCS | Mod: ZF

## 2018-09-06 RX ORDER — CEFAZOLIN SODIUM 1 G/50ML
1 INJECTION, SOLUTION INTRAVENOUS SEE ADMIN INSTRUCTIONS
Status: CANCELLED | OUTPATIENT
Start: 2018-09-06 | End: 2019-09-06

## 2018-09-06 ASSESSMENT — PAIN SCALES - GENERAL: PAINLEVEL: MODERATE PAIN (5)

## 2018-09-06 NOTE — LETTER
9/6/2018       RE: Loli Pearson  [address] [place] [id number]     Dear Colleague,    Thank you for referring your patient, Loli Pearson, to the [facility]. Please see a copy of my visit note below.    THORACIC SURGERY FOLLOW UP VISIT    Dear Dr. Cheng,  I saw Ms. Loli Pearson in follow-up today. The clinical summary follows:     PREOP DIAGNOSIS   Paraesophageal hernia  PROCEDURE   Lap repair of paraesophageal hernia and Nissen fundoplication (03/13/2017)  COMPLICATIONS  Persistent cramping abdominal pain after meals that began a few months after surgery    INTERVAL STUDIES  CT chest and abdomen 6/21/2018- Postoperative changes of Nissen fundoplication. Portion of wrap extends through diaphragmatic hiatus concerning for recurrent hiatal hernia.        NM gastric emptying study 7/6/2018- Normal gastric emptying.    Upper endoscopy 8/10/2018- Normal esophagus, paraesophageal hernia, normal duodenum.      ETOH: Rare  TOB: Never  BMI: 28     SUBJECTIVE  Ms. Pearson complains of LUQ abdominal pain that begins shortly after meals. States that this began a few months after her operation. Has tried small frequent meals without significant improvement. Some nausea and bloating as well, but describes the post prandial pain is her biggest complaint. Denies difficulty swallowing or dysphagia. She also complains of constipation requiring bowel regimen. Feels fatigued frequently, denies recent weight loss.     From a personal perspective, she is here with her . She is not employed.     IMPRESSION(K44.9) Paraesophageal hernia  (primary encounter diagnosis)    47 year-old female with abdominal pain s/p laparoscopic hiatal hernia repair and Nissen Fundoplication, work up concerning for small recurrent hiatal hernia.    PLAN  I spent a total of 45 minutes with Mrs. Loli Pearson and her , more than 50% of which were spent in counseling, coordination of care, and face-to-face  time. I reviewed the plan as follows:    Procedure planned: Redo laparoscopic hiatal hernia repair with Nissen take-down and redo Nissen fundoplication, PEG tube, possible open.   I reviewed the indications, risks, and benefits of the procedure with Ms. Loli Pearson.  We discussed the risks that include but are not limited to bleeding, infection, need for another reoperation, conversion to laparotomy, potential long-term failure, and death. I explained the anticipated hospital course (5-7 days) and postoperative recovery, transient dysphagia, transient diarrhea, and permanent postprandial bloating which can be mitigated with proper dietary habits. We discussed the importance of lifetime awareness to limit lifting heavy weights in case of identifying a hiatal hernia.  We discussed that her symptoms are atypical and that there was about a chance that some of her pain may still persist after the surgery. However, given that there was recurrent hernia identified and that she had GI symptoms taht could be attributable to it, considering surgery was reasonable. She wishes to proceed.  We also discussed that some of her symptoms could be due to chronic constipation and it was impotant for her to get this under control.    Consent: pending  Necessary Preop Tests & Appointments:     Outpatient GI consultation for constipation management      PAC    Regional Anesthesia Plan: local anesthesia at port sites  Anticoagulation Plan: s/c heparin post op  Smoking Cessation: n/a    All questions were answered and Loli Pearson and present family were in agreement with the plan.  I appreciate the opportunity to participate in the care of your patient and will keep you updated.  Sincerely,  All questions were answered and the patient and present family were in agreement with the plan.  I appreciate the opportunity to participate in the care of your patient and will keep you updated.  Sincerely,        Cheryl Schilling MD

## 2018-09-06 NOTE — NURSING NOTE
"Oncology Rooming Note    September 6, 2018 11:40 AM   Loli Pearson is a 48 year old female who presents for:    Chief Complaint   Patient presents with     Oncology Clinic Visit     Return: Hiatal Hernia     Initial Vitals: /69  Pulse 66  Temp 99  F (37.2  C) (Oral)  Resp 16  Ht 1.727 m (5' 8\")  Wt 84.1 kg (185 lb 5 oz)  SpO2 99%  Breastfeeding? No  BMI 28.18 kg/m2 Estimated body mass index is 28.18 kg/(m^2) as calculated from the following:    Height as of this encounter: 1.727 m (5' 8\").    Weight as of this encounter: 84.1 kg (185 lb 5 oz). Body surface area is 2.01 meters squared.  Moderate Pain (5) Comment: Data Unavailable   No LMP recorded. Patient has had a hysterectomy.  Allergies reviewed: Yes  Medications reviewed: Yes    Medications: Medication refills not needed today.  Pharmacy name entered into [facility]: [facility] [address] [place] [address]    Clinical concerns: no new concerns today Dr. Schilling was notified.    10 minutes for nursing intake (face to face time)     Tashi Castellon CMA              "

## 2018-09-06 NOTE — MR AVS SNAPSHOT
"              After Visit Summary   9/6/2018    Loli Pearson    MRN: [id number]           Patient Information     Date Of Birth          1970        Visit Information        Provider Department      9/6/2018 11:30 AM Cheryl Schilling MD [facility]        Today's Diagnoses     Hiatal hernia    -  1       Follow-ups after your visit        Who to contact     If you have questions or need follow up information about today's clinic visit or your schedule please contact [facility] directly at [phone].  Normal or non-critical lab and imaging results will be communicated to you by [employer], letter or phone within 4 business days after the clinic has received the results. If you do not hear from us within 7 days, please contact the clinic through ozuket or phone. If you have a critical or abnormal lab result, we will notify you by phone as soon as possible.  Submit refill requests through [email or web address] or call your pharmacy and they will forward the refill request to us. Please allow 3 business days for your refill to be completed.          Additional Information About Your Visit        MyChart Information     [email or web address] gives you secure access to your electronic health record. If you see a primary care provider, you can also send messages to your care team and make appointments. If you have questions, please call your primary care clinic.  If you do not have a primary care provider, please call [phone] and they will assist you.        Care EveryWhere ID     This is your Care EveryWhere ID. This could be used by other organizations to access your [place] medical records  [id number]        Your Vitals Were     Pulse Temperature Respirations Height Pulse Oximetry Breastfeeding?    66 99  F (37.2  C) (Oral) 16 1.727 m (5' 8\") 99% No    BMI (Body Mass Index)                   28.18 kg/m2            Blood Pressure from Last 3 Encounters:   09/06/18 103/69   08/10/18 120/65 "   06/21/18 103/67    Weight from Last 3 Encounters:   09/06/18 84.1 kg (185 lb 5 oz)   08/10/18 81.6 kg (180 lb)   06/21/18 84.9 kg (187 lb 1.6 oz)              We Performed the Following     Casandra-Operative Worksheet (Thoracic)        Primary Care Provider Office Phone # Fax #    Noemy Rizo, ALEKSANDAR [phone]       [facility] [address] [facility] [address]        Equal Access to Services     [facility] VAUGHN : Hadii aad ku hadasho Soomaali, waaxda luqadaha, qaybta kaalmada adeegyada, waxdanitza reyes hayjustina arteaga . So [facility] [phone].    ATENCIÓN: Si habla español, tiene a aldana disposición servicios gratuitos de asistencia lingüística. [facility] [phone].    We comply with applicable federal civil rights laws and [place] laws. We do not discriminate on the basis of race, color, national origin, age, disability, sex, sexual orientation, or gender identity.            Thank you!     Thank you for choosing [facility]CER [facility] [place] [facility]  for your care. Our goal is always to provide you with excellent care. Hearing back from our patients is one way we can continue to improve our services. Please take a few minutes to complete the written survey that you may receive in the mail after your visit with us. Thank you!             Your Updated Medication List - Protect others around you: Learn how to safely use, store and throw away your medicines at www.disposemymeds.org.          This list is accurate as of 9/6/18  2:05 PM.  Always use your most recent med list.                   Brand Name Dispense Instructions for use Diagnosis    buPROPion 150 MG 12 hr tablet    WELLBUTRIN SR          DULoxetine 30 MG EC capsule    CYMBALTA          HYDROCHLOROTHIAZIDE PO      Take 12.5 mg by mouth daily        IBUPROFEN PO      Take 200 mg by mouth        LORazepam 0.5 MG tablet    ATIVAN          propranolol 60 MG 24 hr capsule    INDERAL LA          sennosides 8.6 MG tablet    SENOKOT    120  each    Take 1-2 tablets by mouth 2 times daily Crush pill.    Adynamic ileus (H)       ZOLPIDEM TARTRATE PO      Take 5 mg by mouth At Bedtime

## 2018-09-07 ENCOUNTER — HOSPITAL ENCOUNTER (INPATIENT)
Facility: CLINIC | Age: 48
Setting detail: SURGERY ADMIT
End: 2018-09-07
Attending: STUDENT IN AN ORGANIZED HEALTH CARE EDUCATION/TRAINING PROGRAM | Admitting: STUDENT IN AN ORGANIZED HEALTH CARE EDUCATION/TRAINING PROGRAM
Payer: MEDICARE

## 2018-09-07 ENCOUNTER — TELEPHONE (OUTPATIENT)
Dept: SURGERY | Facility: CLINIC | Age: 48
End: 2018-09-07

## 2018-09-07 NOTE — TELEPHONE ENCOUNTER
Spoke with patient to schedule surgery with Dr. Schilling and Dr. Griffin   Surgery was scheduled on 11/12    Patient will have H&P at [facility]   Post-Op care appointment will be on 12/13  Patient is aware a / is needed day of surgery.   Surgery packet was mailed, patient has my direct contact information for any further questions.

## 2018-09-18 NOTE — TELEPHONE ENCOUNTER
Pt called asking to cancel surgery.  She is unable to see Gastro until middle of November.  She will call back to reschedule surgery once she has seen Gastro. Surgery and post-op was cancelled.

## 2018-11-26 ENCOUNTER — TRANSFERRED RECORDS (OUTPATIENT)
Dept: HEALTH INFORMATION MANAGEMENT | Facility: CLINIC | Age: 48
End: 2018-11-26

## 2019-03-20 ENCOUNTER — MEDICAL CORRESPONDENCE (OUTPATIENT)
Dept: HEALTH INFORMATION MANAGEMENT | Facility: CLINIC | Age: 49
End: 2019-03-20

## 2019-03-20 ENCOUNTER — TRANSFERRED RECORDS (OUTPATIENT)
Dept: HEALTH INFORMATION MANAGEMENT | Facility: CLINIC | Age: 49
End: 2019-03-20

## 2019-03-28 ENCOUNTER — TRANSFERRED RECORDS (OUTPATIENT)
Dept: HEALTH INFORMATION MANAGEMENT | Facility: CLINIC | Age: 49
End: 2019-03-28

## 2019-04-02 NOTE — PROGRESS NOTES
THORACIC SURGERY FOLLOW UP VISIT     Dear Noemy Price,  I saw Ms. Pearson in follow-up today. The clinical summary follows:      PREOP DIAGNOSIS   Paraesophageal hernia    PROCEDURE   Lap repair of paraesophageal hernia and Nissen fundoplication (03/13/2017)    COMPLICATIONS  Persistent cramping abdominal pain after meals that began a few months after surgery     INTERVAL STUDIES  CT abdo 3/20  No new changes. Stable small hiatal hernia     ETOH Rare  TOB Never        SUBJECTIVE   Ms. Pearson still complains of the LUQ pain soon after eating. SHe has been to a GI specialist and had a colonoscopy where she was told she had a long redundant colon. She still has issues with constipation..     From a personal perspective, she is here with her  today.     IMPRESSION   61 year-old female s/p Laparoscopic Nissen fundoplication with post operative pain issues and small recurrent hernia.     PLAN  I reviewed the plan as follows:  Procedure planned: Redo laparoscopic hiatal hernia repair with Nissen take-down and possible redo Nissen fundoplication, PEG tube, possible open.   I reviewed the indications, risks, and benefits of the procedure with Ms. Loli Pearson.  We discussed the risks that include but are not limited to bleeding, infection, need for another reoperation, conversion to laparotomy, potential long-term failure, and death. I explained the anticipated hospital course (5-7 days) and postoperative recovery, transient dysphagia, transient diarrhea, and permanent postprandial bloating which can be mitigated with proper dietary habits. We discussed the importance of lifetime awareness to limit lifting heavy weights in case of identifying a hiatal hernia.  We discussed again that her symptoms are atypical and that there was about a chance that some of her pain may still persist after the surgery. However, given that there was recurrent hernia identified and that she had GI symptoms taht could be  attributable to it, considering surgery was reasonable.   We also discussed that some of her symptoms could be due to chronic constipation and it was impotant for her to get this under control as it can contritbute to increased IAP and risk of recurrence       Necessary Preop Tests & Appointments:   Obtain recent esophagram  Discuss with general surgery regarding simultaneous cholecystectomy  Get on a bowel regimen that works for her      Regional Anesthesia Plan: local at port sites  Anticoagulation Plan: lovenox post op   Smoking Cessation: n/a    All questions were answered and Loli Pearson and present family were in agreement with the plan.  I appreciate the opportunity to participate in the care of your patient and will keep you updated.  Sincerely,        They had all their questions answered and were in agreement with the plan.  I appreciate the opportunity to participate in the care of your patient and will keep you updated.  Sincerely,    Answers for HPI/ROS submitted by the patient on 4/4/2019   General Symptoms: Yes  Skin Symptoms: No  HENT Symptoms: Yes  EYE SYMPTOMS: No  HEART SYMPTOMS: Yes  LUNG SYMPTOMS: Yes  INTESTINAL SYMPTOMS: Yes  URINARY SYMPTOMS: Yes  GYNECOLOGIC SYMPTOMS: Yes  BREAST SYMPTOMS: No  SKELETAL SYMPTOMS: Yes  BLOOD SYMPTOMS: Yes  NERVOUS SYSTEM SYMPTOMS: Yes  MENTAL HEALTH SYMPTOMS: Yes  Fever: Yes  Fatigue: Yes  Night sweats: Yes  Chills: Yes  Increased stress: Yes  Excessive hunger: No  Excessive thirst: No  Feeling hot or cold when others believe the temperature is normal: Yes  Loss of height: No  Post-operative complications: Yes  Surgical site pain: Yes  Hallucinations: No  Change in or Loss of Energy: No  Hyperactivity: No  Confusion: No  Ear pain: Yes  Ear discharge: Yes  Hearing loss: Yes  Tinnitus: Yes  Nosebleeds: No  Congestion: No  Sinus pain: No  Trouble swallowing: No   Voice hoarseness: No  Mouth sores: No  Sore throat: Yes  Tooth pain: Yes  Gum tenderness:  Yes  Bleeding gums: No  Change in taste: Yes  Change in sense of smell: No  Dry mouth: No  Hearing aid used: No  Neck lump: No  Cough: No  Sputum or phlegm: No  Coughing up blood: No  Difficulty breating or shortness of breath: No  Snoring: Yes  Wheezing: No  Difficulty breathing on exertion: No  Nighttime Cough: No  Difficulty breathing when lying flat: No  Chest pain or pressure: No  Fast or irregular heartbeat: Yes  Pain in legs with walking: Yes  Trouble breathing while lying down: No  Fingers or toes appear blue: No  High blood pressure: No  Low blood pressure: No  Fainting: No  Murmurs: No  Pacemaker: No  Varicose veins: No  Edema or swelling: Yes  Wake up at night with shortness of breath: No  Light-headedness: Yes  Exercise intolerance: Yes  Heart burn or indigestion: No  Nausea: Yes  Vomiting: No  Abdominal pain: Yes  Bloating: Yes  Constipation: Yes  Diarrhea: No  Blood in stool: No  Black stools: No  Rectal or Anal pain: No  Fecal incontinence: No  Yellowing of skin or eyes: No  Vomit with blood: No  Change in stools: No  Trouble holding urine or incontinence: No  Pain or burning: No  Trouble starting or stopping: No  Increased frequency of urination: No  Blood in urine: No  Decreased frequency of urination: No  Frequent nighttime urination: No  Flank pain: Yes  Difficulty emptying bladder: Yes  Back pain: Yes  Muscle aches: Yes  Neck pain: Yes  Swollen joints: Yes  Joint pain: Yes  Bone pain: Yes  Muscle cramps: No  Muscle weakness: Yes  Joint stiffness: Yes  Bone fracture: No  Anemia: No  Swollen glands: No  Easy bleeding or bruising: No  Trouble with coordination: Yes  Dizziness or trouble with balance: Yes  Fainting or black-out spells: No  Memory loss: No  Headache: Yes  Seizures: No  Speech problems: Yes  Tingling: Yes  Tremor: Yes  Weakness: Yes  Difficulty walking: Yes  Paralysis: No  Numbness: Yes  Bleeding or spotting between periods: No  Heavy or painful periods: No  Irregular periods:  No  Vaginal discharge: Yes  Hot flashes: Yes  Vaginal dryness: No  Genital ulcers: No  Reduced libido: Yes  Painful intercourse: No  Difficulty with sexual arousal: Yes  Post-menopausal bleeding: No  Nervous or Anxious: Yes  Depression: Yes  Trouble sleeping: Yes  Trouble thinking or concentrating: Yes  Mood changes: Yes  Panic attacks: Yes

## 2019-04-04 ENCOUNTER — OFFICE VISIT (OUTPATIENT)
Dept: SURGERY | Facility: CLINIC | Age: 49
End: 2019-04-04
Attending: STUDENT IN AN ORGANIZED HEALTH CARE EDUCATION/TRAINING PROGRAM
Payer: MEDICARE

## 2019-04-04 VITALS
HEART RATE: 71 BPM | HEIGHT: 68 IN | BODY MASS INDEX: 27.96 KG/M2 | TEMPERATURE: 98.8 F | OXYGEN SATURATION: 97 % | DIASTOLIC BLOOD PRESSURE: 71 MMHG | SYSTOLIC BLOOD PRESSURE: 113 MMHG | WEIGHT: 184.5 LBS | RESPIRATION RATE: 16 BRPM

## 2019-04-04 DIAGNOSIS — K44.9 HIATAL HERNIA: Primary | ICD-10-CM

## 2019-04-04 PROCEDURE — G0463 HOSPITAL OUTPT CLINIC VISIT: HCPCS | Mod: ZF

## 2019-04-04 RX ORDER — CEFAZOLIN SODIUM 2 G/50ML
2 SOLUTION INTRAVENOUS
Status: CANCELLED | OUTPATIENT
Start: 2019-04-04

## 2019-04-04 RX ORDER — LACTULOSE 10 G/15ML
SOLUTION ORAL
COMMUNITY

## 2019-04-04 RX ORDER — CEFAZOLIN SODIUM 1 G/50ML
1 INJECTION, SOLUTION INTRAVENOUS SEE ADMIN INSTRUCTIONS
Status: CANCELLED | OUTPATIENT
Start: 2019-04-04

## 2019-04-04 RX ORDER — OMEPRAZOLE 40 MG/1
40 CAPSULE, DELAYED RELEASE ORAL
COMMUNITY

## 2019-04-04 ASSESSMENT — ENCOUNTER SYMPTOMS
DEPRESSION: 1
SLEEP DISTURBANCES DUE TO BREATHING: 0
SMELL DISTURBANCE: 0
FATIGUE: 1
HOT FLASHES: 1
CONSTIPATION: 1
ALTERED TEMPERATURE REGULATION: 1
CHILLS: 1
PALPITATIONS: 1
HEADACHES: 1
SEIZURES: 0
POLYPHAGIA: 0
MYALGIAS: 1
INSOMNIA: 1
PANIC: 1
INCREASED ENERGY: 0
RECTAL PAIN: 0
DIARRHEA: 0
DIFFICULTY URINATING: 1
DECREASED LIBIDO: 1
MUSCLE CRAMPS: 0
NECK PAIN: 1
HEARTBURN: 0
TROUBLE SWALLOWING: 0
PARALYSIS: 0
BOWEL INCONTINENCE: 0
ORTHOPNEA: 0
LIGHT-HEADEDNESS: 1
SORE THROAT: 1
COUGH DISTURBING SLEEP: 0
FLANK PAIN: 1
TASTE DISTURBANCE: 1
TREMORS: 1
MEMORY LOSS: 0
DIZZINESS: 1
JAUNDICE: 0
NAUSEA: 1
SYNCOPE: 0
SINUS CONGESTION: 0
HYPERTENSION: 0
DYSPNEA ON EXERTION: 0
VOMITING: 0
DECREASED CONCENTRATION: 1
DYSURIA: 0
BLOATING: 1
ABDOMINAL PAIN: 1
ARTHRALGIAS: 1
BLOOD IN STOOL: 0
SHORTNESS OF BREATH: 0
JOINT SWELLING: 1
POLYDIPSIA: 0
BACK PAIN: 1
HEMOPTYSIS: 0
NUMBNESS: 1
SNORES LOUDLY: 1
COUGH: 0
WHEEZING: 0
HALLUCINATIONS: 0
NECK MASS: 0
HYPOTENSION: 0
DISTURBANCES IN COORDINATION: 1
SWOLLEN GLANDS: 0
FEVER: 1
POSTURAL DYSPNEA: 0
SPUTUM PRODUCTION: 0
MUSCLE WEAKNESS: 1
LOSS OF CONSCIOUSNESS: 0
TINGLING: 1
WEAKNESS: 1
SPEECH CHANGE: 1
EXERCISE INTOLERANCE: 1
BRUISES/BLEEDS EASILY: 0
NIGHT SWEATS: 1
HOARSE VOICE: 0
LEG PAIN: 1
SINUS PAIN: 0
STIFFNESS: 1
NERVOUS/ANXIOUS: 1
HEMATURIA: 0

## 2019-04-04 ASSESSMENT — MIFFLIN-ST. JEOR: SCORE: 1505.26

## 2019-04-04 ASSESSMENT — PAIN SCALES - GENERAL: PAINLEVEL: MODERATE PAIN (4)

## 2019-04-04 NOTE — NURSING NOTE
"Oncology Rooming Note    April 4, 2019 11:56 AM   Loli Pearson is a 48 year old female who presents for:    Chief Complaint   Patient presents with     Oncology Clinic Visit     [facility] RETURN- HIATAL HERNIA     Initial Vitals: /71 (BP Location: Left arm, Patient Position: Chair, Cuff Size: Adult Regular)   Pulse 71   Temp 98.8  F (37.1  C) (Tympanic)   Resp 16   Ht 1.727 m (5' 7.99\")   Wt 83.7 kg (184 lb 8 oz)   SpO2 97%   BMI 28.06 kg/m   Estimated body mass index is 28.06 kg/m  as calculated from the following:    Height as of this encounter: 1.727 m (5' 7.99\").    Weight as of this encounter: 83.7 kg (184 lb 8 oz). Body surface area is 2 meters squared.  Moderate Pain (4) Comment: Data Unavailable   No LMP recorded. Patient has had a hysterectomy.  Allergies reviewed: Yes  Medications reviewed: Yes    Medications: Medication refills not needed today.  Pharmacy name entered into [facility]: [facility] [address] [address]    Clinical concerns: No new concerns. ADIN Schilling was notified.      Arjun Packer LPN            "

## 2019-04-04 NOTE — LETTER
4/4/2019       RE: Loli Pearson  [address] [place] [id number]     Dear Colleague,    Thank you for referring your patient, Loli Pearson, to the [facility]. Please see a copy of my visit note below.    THORACIC SURGERY FOLLOW UP VISIT     Dear Noemy Price,  I saw Ms. Pearson in follow-up today. The clinical summary follows:      PREOP DIAGNOSIS   Paraesophageal hernia    PROCEDURE   Lap repair of paraesophageal hernia and Nissen fundoplication (03/13/2017)    COMPLICATIONS  Persistent cramping abdominal pain after meals that began a few months after surgery     INTERVAL STUDIES  CT abdo 3/20  No new changes. Stable small hiatal hernia     ETOH Rare  TOB Never        SUBJECTIVE   Ms. Pearson still complains of the LUQ pain soon after eating. SHe has been to a GI specialist and had a colonoscopy where she was told she had a long redundant colon. She still has issues with constipation..     From a personal perspective, she is here with her  today.     IMPRESSION   61 year-old female s/p Laparoscopic Nissen fundoplication with post operative pain issues and small recurrent hernia.     PLAN  I reviewed the plan as follows:  Procedure planned: Redo laparoscopic hiatal hernia repair with Nissen take-down and possible redo Nissen fundoplication, PEG tube, possible open.   I reviewed the indications, risks, and benefits of the procedure with Ms. Loli Pearson.  We discussed the risks that include but are not limited to bleeding, infection, need for another reoperation, conversion to laparotomy, potential long-term failure, and death. I explained the anticipated hospital course (5-7 days) and postoperative recovery, transient dysphagia, transient diarrhea, and permanent postprandial bloating which can be mitigated with proper dietary habits. We discussed the importance of lifetime awareness to limit lifting heavy weights in case of identifying a hiatal hernia.  We  discussed again that her symptoms are atypical and that there was about a chance that some of her pain may still persist after the surgery. However, given that there was recurrent hernia identified and that she had GI symptoms taht could be attributable to it, considering surgery was reasonable.   We also discussed that some of her symptoms could be due to chronic constipation and it was impotant for her to get this under control as it can contritbute to increased IAP and risk of recurrence       Necessary Preop Tests & Appointments:   Obtain recent esophagram  Discuss with general surgery regarding simultaneous cholecystectomy  Get on a bowel regimen that works for her      Regional Anesthesia Plan: local at port sites  Anticoagulation Plan: lovenox post op   Smoking Cessation: n/a    All questions were answered and Loli Pearson and present family were in agreement with the plan.  I appreciate the opportunity to participate in the care of your patient and will keep you updated.  Sincerely,        They had all their questions answered and were in agreement with the plan.  I appreciate the opportunity to participate in the care of your patient and will keep you updated.  Sincerely,    Answers for HPI/ROS submitted by the patient on 4/4/2019   General Symptoms: Yes  Skin Symptoms: No  HENT Symptoms: Yes  EYE SYMPTOMS: No  HEART SYMPTOMS: Yes  LUNG SYMPTOMS: Yes  INTESTINAL SYMPTOMS: Yes  URINARY SYMPTOMS: Yes  GYNECOLOGIC SYMPTOMS: Yes  BREAST SYMPTOMS: No  SKELETAL SYMPTOMS: Yes  BLOOD SYMPTOMS: Yes  NERVOUS SYSTEM SYMPTOMS: Yes  MENTAL HEALTH SYMPTOMS: Yes  Fever: Yes  Fatigue: Yes  Night sweats: Yes  Chills: Yes  Increased stress: Yes  Excessive hunger: No  Excessive thirst: No  Feeling hot or cold when others believe the temperature is normal: Yes  Loss of height: No  Post-operative complications: Yes  Surgical site pain: Yes  Hallucinations: No  Change in or Loss of Energy: No  Hyperactivity: No  Confusion:  No  Ear pain: Yes  Ear discharge: Yes  Hearing loss: Yes  Tinnitus: Yes  Nosebleeds: No  Congestion: No  Sinus pain: No  Trouble swallowing: No   Voice hoarseness: No  Mouth sores: No  Sore throat: Yes  Tooth pain: Yes  Gum tenderness: Yes  Bleeding gums: No  Change in taste: Yes  Change in sense of smell: No  Dry mouth: No  Hearing aid used: No  Neck lump: No  Cough: No  Sputum or phlegm: No  Coughing up blood: No  Difficulty breating or shortness of breath: No  Snoring: Yes  Wheezing: No  Difficulty breathing on exertion: No  Nighttime Cough: No  Difficulty breathing when lying flat: No  Chest pain or pressure: No  Fast or irregular heartbeat: Yes  Pain in legs with walking: Yes  Trouble breathing while lying down: No  Fingers or toes appear blue: No  High blood pressure: No  Low blood pressure: No  Fainting: No  Murmurs: No  Pacemaker: No  Varicose veins: No  Edema or swelling: Yes  Wake up at night with shortness of breath: No  Light-headedness: Yes  Exercise intolerance: Yes  Heart burn or indigestion: No  Nausea: Yes  Vomiting: No  Abdominal pain: Yes  Bloating: Yes  Constipation: Yes  Diarrhea: No  Blood in stool: No  Black stools: No  Rectal or Anal pain: No  Fecal incontinence: No  Yellowing of skin or eyes: No  Vomit with blood: No  Change in stools: No  Trouble holding urine or incontinence: No  Pain or burning: No  Trouble starting or stopping: No  Increased frequency of urination: No  Blood in urine: No  Decreased frequency of urination: No  Frequent nighttime urination: No  Flank pain: Yes  Difficulty emptying bladder: Yes  Back pain: Yes  Muscle aches: Yes  Neck pain: Yes  Swollen joints: Yes  Joint pain: Yes  Bone pain: Yes  Muscle cramps: No  Muscle weakness: Yes  Joint stiffness: Yes  Bone fracture: No  Anemia: No  Swollen glands: No  Easy bleeding or bruising: No  Trouble with coordination: Yes  Dizziness or trouble with balance: Yes  Fainting or black-out spells: No  Memory loss: No  Headache:  Yes  Seizures: No  Speech problems: Yes  Tingling: Yes  Tremor: Yes  Weakness: Yes  Difficulty walking: Yes  Paralysis: No  Numbness: Yes  Bleeding or spotting between periods: No  Heavy or painful periods: No  Irregular periods: No  Vaginal discharge: Yes  Hot flashes: Yes  Vaginal dryness: No  Genital ulcers: No  Reduced libido: Yes  Painful intercourse: No  Difficulty with sexual arousal: Yes  Post-menopausal bleeding: No  Nervous or Anxious: Yes  Depression: Yes  Trouble sleeping: Yes  Trouble thinking or concentrating: Yes  Mood changes: Yes  Panic attacks: Yes      Again, thank you for allowing me to participate in the care of your patient.      Sincerely,    Cheryl Schilling MD

## 2019-04-17 ENCOUNTER — HOSPITAL ENCOUNTER (INPATIENT)
Facility: CLINIC | Age: 49
Setting detail: SURGERY ADMIT
End: 2019-04-17
Attending: STUDENT IN AN ORGANIZED HEALTH CARE EDUCATION/TRAINING PROGRAM | Admitting: STUDENT IN AN ORGANIZED HEALTH CARE EDUCATION/TRAINING PROGRAM
Payer: MEDICARE

## 2019-04-17 ENCOUNTER — TELEPHONE (OUTPATIENT)
Dept: SURGERY | Facility: CLINIC | Age: 49
End: 2019-04-17

## 2019-04-17 ENCOUNTER — TRANSFERRED RECORDS (OUTPATIENT)
Dept: HEALTH INFORMATION MANAGEMENT | Facility: CLINIC | Age: 49
End: 2019-04-17

## 2019-04-17 NOTE — TELEPHONE ENCOUNTER
Spoke with patient to schedule procedure with Dr. Cheryl Schilling and Dr. Abdulkadir Griffin    Procedure was scheduled on 05/13  Patient will have H&P with PAC on 05/06  Patient is aware a / is needed day of surgery.   Surgery letter was sent via [facility], patient has my direct contact information for any further questions.

## 2019-04-22 NOTE — TELEPHONE ENCOUNTER
Spoke with patient to re-schedule procedure with Dr. Cheryl Schilling and Dr. Abdulkadir Griffin   Procedure was scheduled on 06/03  Patient will have H&P with PAC on 05/15  Patient is aware a / is needed day of surgery.   Surgery letter was sent via Mail, patient has my direct contact information for any further questions.

## 2019-05-01 ENCOUNTER — PRE VISIT (OUTPATIENT)
Dept: SURGERY | Facility: CLINIC | Age: 49
End: 2019-05-01

## 2019-05-01 NOTE — TELEPHONE ENCOUNTER
FUTURE VISIT INFORMATION      SURGERY INFORMATION:    Date: 6/3/19    Location: [facility] OR    Surgeon:  Abdulkadir Blanco    Anesthesia Type:  General    RECORDS REQUESTED FROM:       Primary Care Provider: Mariaelena Rizo - [facility]    Pertinent Medical History: Hiatal Hernia    Most recent EKG+ Tracing: ECG 4/19/18- [facility]

## 2019-05-03 NOTE — TELEPHONE ENCOUNTER
Patient called to cancel surgery with Dr. Schilling and Dr. Griffin,   Patient stated she had two brothers passed away and is having a hard time coping.   She will call back once she is ready to proceed.

## 2020-03-10 ENCOUNTER — HEALTH MAINTENANCE LETTER (OUTPATIENT)
Age: 50
End: 2020-03-10

## 2020-12-27 ENCOUNTER — HEALTH MAINTENANCE LETTER (OUTPATIENT)
Age: 50
End: 2020-12-27

## 2021-04-14 NOTE — TELEPHONE ENCOUNTER
Call to Loli to let her know that Dr. Schilling would like for her to have an impedance and manometry given her previous episodes of dysphagia. This information will help determine if Dr. Schilling will do an anti-reflux procedure at the time of her hernia repair. Loli is in agreement with this plan and will await a call with appointment information.  
normal...

## 2021-04-24 ENCOUNTER — HEALTH MAINTENANCE LETTER (OUTPATIENT)
Age: 51
End: 2021-04-24

## 2021-10-09 ENCOUNTER — HEALTH MAINTENANCE LETTER (OUTPATIENT)
Age: 51
End: 2021-10-09

## 2022-05-16 ENCOUNTER — HEALTH MAINTENANCE LETTER (OUTPATIENT)
Age: 52
End: 2022-05-16

## 2022-08-15 PROCEDURE — 88305 TISSUE EXAM BY PATHOLOGIST: CPT | Mod: TC,ORL | Performed by: INTERNAL MEDICINE

## 2022-08-16 ENCOUNTER — LAB REQUISITION (OUTPATIENT)
Dept: LAB | Facility: CLINIC | Age: 52
End: 2022-08-16
Payer: MEDICARE

## 2022-08-16 DIAGNOSIS — K31.89 OTHER DISEASES OF STOMACH AND DUODENUM: ICD-10-CM

## 2022-08-16 DIAGNOSIS — R11.2 NAUSEA WITH VOMITING, UNSPECIFIED: ICD-10-CM

## 2022-08-16 DIAGNOSIS — R12 HEARTBURN: ICD-10-CM

## 2022-08-16 DIAGNOSIS — R10.13 EPIGASTRIC PAIN: ICD-10-CM

## 2022-08-16 DIAGNOSIS — Z12.11 ENCOUNTER FOR SCREENING FOR MALIGNANT NEOPLASM OF COLON: ICD-10-CM

## 2022-08-16 DIAGNOSIS — D12.3 BENIGN NEOPLASM OF TRANSVERSE COLON: ICD-10-CM

## 2022-08-16 DIAGNOSIS — K57.30 DIVERTICULOSIS OF LARGE INTESTINE WITHOUT PERFORATION OR ABSCESS WITHOUT BLEEDING: ICD-10-CM

## 2022-08-16 DIAGNOSIS — Z98.890 OTHER SPECIFIED POSTPROCEDURAL STATES: ICD-10-CM

## 2022-08-18 LAB
PATH REPORT.COMMENTS IMP SPEC: NORMAL
PATH REPORT.COMMENTS IMP SPEC: NORMAL
PATH REPORT.FINAL DX SPEC: NORMAL
PATH REPORT.GROSS SPEC: NORMAL
PATH REPORT.MICROSCOPIC SPEC OTHER STN: NORMAL
PATH REPORT.RELEVANT HX SPEC: NORMAL
PHOTO IMAGE: NORMAL

## 2022-08-18 PROCEDURE — 88305 TISSUE EXAM BY PATHOLOGIST: CPT | Mod: 26 | Performed by: PATHOLOGY

## 2022-09-11 ENCOUNTER — HEALTH MAINTENANCE LETTER (OUTPATIENT)
Age: 52
End: 2022-09-11

## 2023-06-03 ENCOUNTER — HEALTH MAINTENANCE LETTER (OUTPATIENT)
Age: 53
End: 2023-06-03

## 2023-08-11 NOTE — PHARMACY-ADMISSION MEDICATION HISTORY
Admission medication history interview status for the 3/18/2017 admission is complete. See Epic admission navigator for allergy information, pharmacy, prior to admission medications and immunization status.     Medication history interview sources:  Patient    Changes made to PTA medication list (reason)  Added: None  Deleted: None  Changed:   - Hydrochlorothiazide: Changed 25 mg to 12.5 mg daily per patient. Patient confirmed she is only taking a half-tablet once daily.     Additional medication history information (including reliability of information, actions taken by pharmacist):  Patient was an excellent historian. Patient used handwritten medication list that also had record of timing of last doses. The patient has not received the flu shot this season.     Allergies: Patient stated she has no known allergies.       Prior to Admission medications    Medication Sig Last Dose Taking? Auth Provider   bisacodyl (DULCOLAX) 10 MG Suppository Place 1 suppository (10 mg) rectally daily as needed for constipation 3/17/2017 at 2000 Yes Celeste Vasques APRN CNS   sennosides (SENOKOT) 8.8 MG/5ML syrup Take 10 mLs by mouth 2 times daily 3/17/2017 at 2200 Yes Steven Jama PA-C   acetaminophen (TYLENOL) 160 MG/5ML solution Take 30.45 mLs (975 mg) by mouth every 8 hours 3/17/2017 at Unknown time Yes Steven Jama PA-C   docusate (COLACE) 50 MG/5ML liquid Take 10 mLs (100 mg) by mouth 2 times daily 3/17/2017 at 2200 Yes Steven Jama PA-C   oxyCODONE (ROXICODONE) 5 MG/5ML solution Take 5-10 mLs (5-10 mg) by mouth every 4 hours as needed for moderate to severe pain 3/17/2017 at 1600 Yes Steven Jama PA-C   NORTRIPTYLINE HCL PO Take 25 mg by mouth At Bedtime 3/16/2017 at Unknown time Yes Reported, Patient   HYDROCHLOROTHIAZIDE PO Take 12.5 mg by mouth daily  3/17/2017 at AM Yes Reported, Patient   PRAMIPEXOLE DIHYDROCHLORIDE PO Take 0.25 mg by mouth daily 3/17/2017 at PM Yes Reported, Patient          Medication history completed by: Lucía Vicente     good, to achieve stated therapy goals

## (undated) DEVICE — SU VICRYL 0 TIE 54" J608H

## (undated) DEVICE — SU PLEDGET SOFT TFE 13MMX7MMX1.5MM D7044

## (undated) DEVICE — SOL NACL 0.9% IRRIG 1000ML BOTTLE 2F7124

## (undated) DEVICE — SU VICRYL 2-0 SH 27" UND J417H

## (undated) DEVICE — ESU GROUND PAD ADULT W/CORD E7507

## (undated) DEVICE — TUBE GASTROSTOMY PONSKY PULL DELUXE 20FR 000792

## (undated) DEVICE — LINEN TOWEL PACK X5 5464

## (undated) DEVICE — KIT ENDO FIRST STEP DISINFECTANT 200ML W/POUCH EP-4

## (undated) DEVICE — ENDO TROCAR 12MM VERSAONE BLADELESS W/STD FIX CAN NONB12STF

## (undated) DEVICE — Device

## (undated) DEVICE — DEVICE SUTURE GRASPER TROCAR CLOSURE 14GA PMITCSG

## (undated) DEVICE — SU VICRYL 0 UR-6 27" J603H

## (undated) DEVICE — PACK GOWN 3/PK DISP XL SBA32GPFCB

## (undated) DEVICE — LINEN GOWN XLG 5407

## (undated) DEVICE — APPLICATOR COTTON TIP 6"X2 STERILE LF 6012

## (undated) DEVICE — BAG URINARY DRAIN LUBRISIL IC 4000ML LF 253509A

## (undated) DEVICE — LINEN TOWEL PACK X6 WHITE 5487

## (undated) DEVICE — SU SILK 0 SH 30" K834H

## (undated) DEVICE — ESU PENCIL W/COATED BLADE E2450H

## (undated) DEVICE — SU TICRON 2 GS-21DA 36" 3146-81

## (undated) DEVICE — DRSG PRIMAPORE 02X3" 7133

## (undated) DEVICE — SUCTION MANIFOLD DORNOCH ULTRA CART UL-CL500

## (undated) DEVICE — STPL ENDO RELOAD 60MM MEDIUM THICK PURPLE EGIA60AMT

## (undated) DEVICE — SU DERMABOND ADVANCED .7ML DNX12

## (undated) DEVICE — STPL ENDO RELOAD 45MM MEDIUM THICK PURPLE EGIA45AMT

## (undated) DEVICE — DRSG STERI STRIP 1/2X4" R1547

## (undated) DEVICE — SU ENDO STITCH SURGIDAC 2-0 ES-9 7" TRIPLE STITCH 170041

## (undated) DEVICE — SU SILK 2-0 SH 30" K833H

## (undated) DEVICE — STPL ENDO HANDLE GIA ULTRA UNIVERSAL STD EGIAUSTND

## (undated) DEVICE — SOL WATER IRRIG 1000ML BOTTLE 2F7114

## (undated) DEVICE — ESU LIGASURE MARYLAND JAW OPEN SEALER/DVDR 5MMX37CM LF1737

## (undated) DEVICE — PREP CHLORAPREP 26ML TINTED ORANGE  260815

## (undated) DEVICE — TUBING SUCTION 10'X3/16" N510

## (undated) DEVICE — JELLY LUBRICATING SURGILUBE 2OZ TUBE

## (undated) DEVICE — DRAPE IOBAN INCISE 23X17" 6650EZ

## (undated) DEVICE — SU VICRYL 4-0 PS-2 18" UND J496H

## (undated) RX ORDER — HYDROMORPHONE HYDROCHLORIDE 1 MG/ML
INJECTION, SOLUTION INTRAMUSCULAR; INTRAVENOUS; SUBCUTANEOUS
Status: DISPENSED
Start: 2017-03-13

## (undated) RX ORDER — CEFAZOLIN SODIUM 2 G/100ML
INJECTION, SOLUTION INTRAVENOUS
Status: DISPENSED
Start: 2017-03-13

## (undated) RX ORDER — FENTANYL CITRATE 50 UG/ML
INJECTION, SOLUTION INTRAMUSCULAR; INTRAVENOUS
Status: DISPENSED
Start: 2017-03-13

## (undated) RX ORDER — FENTANYL CITRATE 50 UG/ML
INJECTION, SOLUTION INTRAMUSCULAR; INTRAVENOUS
Status: DISPENSED
Start: 2018-08-10

## (undated) RX ORDER — LIDOCAINE HYDROCHLORIDE 10 MG/ML
INJECTION, SOLUTION EPIDURAL; INFILTRATION; INTRACAUDAL; PERINEURAL
Status: DISPENSED
Start: 2017-03-13

## (undated) RX ORDER — DIPHENHYDRAMINE HYDROCHLORIDE 50 MG/ML
INJECTION INTRAMUSCULAR; INTRAVENOUS
Status: DISPENSED
Start: 2018-08-10

## (undated) RX ORDER — BUPIVACAINE HYDROCHLORIDE 2.5 MG/ML
INJECTION, SOLUTION EPIDURAL; INFILTRATION; INTRACAUDAL
Status: DISPENSED
Start: 2017-03-13